# Patient Record
Sex: MALE | Race: WHITE | NOT HISPANIC OR LATINO | Employment: FULL TIME | URBAN - METROPOLITAN AREA
[De-identification: names, ages, dates, MRNs, and addresses within clinical notes are randomized per-mention and may not be internally consistent; named-entity substitution may affect disease eponyms.]

---

## 2020-03-11 ENCOUNTER — LAB (OUTPATIENT)
Dept: LAB | Facility: CLINIC | Age: 65
End: 2020-03-11
Payer: COMMERCIAL

## 2020-03-11 DIAGNOSIS — K42.9 UMBILICAL HERNIA WITHOUT OBSTRUCTION OR GANGRENE: ICD-10-CM

## 2020-03-11 LAB
ALBUMIN SERPL BCP-MCNC: 3.5 G/DL (ref 3.5–5)
ALP SERPL-CCNC: 78 U/L (ref 46–116)
ALT SERPL W P-5'-P-CCNC: 29 U/L (ref 12–78)
ANION GAP SERPL CALCULATED.3IONS-SCNC: 8 MMOL/L (ref 4–13)
AST SERPL W P-5'-P-CCNC: 16 U/L (ref 5–45)
BASOPHILS # BLD AUTO: 0.03 THOUSANDS/ΜL (ref 0–0.1)
BASOPHILS NFR BLD AUTO: 1 % (ref 0–1)
BILIRUB SERPL-MCNC: 0.42 MG/DL (ref 0.2–1)
BUN SERPL-MCNC: 18 MG/DL (ref 5–25)
CALCIUM SERPL-MCNC: 8.8 MG/DL (ref 8.3–10.1)
CHLORIDE SERPL-SCNC: 107 MMOL/L (ref 100–108)
CO2 SERPL-SCNC: 27 MMOL/L (ref 21–32)
CREAT SERPL-MCNC: 0.85 MG/DL (ref 0.6–1.3)
EOSINOPHIL # BLD AUTO: 0.03 THOUSAND/ΜL (ref 0–0.61)
EOSINOPHIL NFR BLD AUTO: 1 % (ref 0–6)
ERYTHROCYTE [DISTWIDTH] IN BLOOD BY AUTOMATED COUNT: 13.7 % (ref 11.6–15.1)
GFR SERPL CREATININE-BSD FRML MDRD: 91 ML/MIN/1.73SQ M
GLUCOSE SERPL-MCNC: 106 MG/DL (ref 65–140)
HCT VFR BLD AUTO: 48.8 % (ref 36.5–49.3)
HGB BLD-MCNC: 16.4 G/DL (ref 12–17)
IMM GRANULOCYTES # BLD AUTO: 0.01 THOUSAND/UL (ref 0–0.2)
IMM GRANULOCYTES NFR BLD AUTO: 0 % (ref 0–2)
LYMPHOCYTES # BLD AUTO: 1.45 THOUSANDS/ΜL (ref 0.6–4.47)
LYMPHOCYTES NFR BLD AUTO: 29 % (ref 14–44)
MCH RBC QN AUTO: 30.9 PG (ref 26.8–34.3)
MCHC RBC AUTO-ENTMCNC: 33.6 G/DL (ref 31.4–37.4)
MCV RBC AUTO: 92 FL (ref 82–98)
MONOCYTES # BLD AUTO: 0.61 THOUSAND/ΜL (ref 0.17–1.22)
MONOCYTES NFR BLD AUTO: 12 % (ref 4–12)
NEUTROPHILS # BLD AUTO: 2.85 THOUSANDS/ΜL (ref 1.85–7.62)
NEUTS SEG NFR BLD AUTO: 57 % (ref 43–75)
NRBC BLD AUTO-RTO: 0 /100 WBCS
PLATELET # BLD AUTO: 175 THOUSANDS/UL (ref 149–390)
PMV BLD AUTO: 9.6 FL (ref 8.9–12.7)
POTASSIUM SERPL-SCNC: 4.3 MMOL/L (ref 3.5–5.3)
PROT SERPL-MCNC: 7.3 G/DL (ref 6.4–8.2)
RBC # BLD AUTO: 5.3 MILLION/UL (ref 3.88–5.62)
SODIUM SERPL-SCNC: 142 MMOL/L (ref 136–145)
WBC # BLD AUTO: 4.98 THOUSAND/UL (ref 4.31–10.16)

## 2020-03-11 PROCEDURE — 80053 COMPREHEN METABOLIC PANEL: CPT

## 2020-03-11 PROCEDURE — 85025 COMPLETE CBC W/AUTO DIFF WBC: CPT

## 2020-03-11 PROCEDURE — 36415 COLL VENOUS BLD VENIPUNCTURE: CPT

## 2020-03-11 PROCEDURE — 93005 ELECTROCARDIOGRAM TRACING: CPT

## 2020-03-12 LAB
ATRIAL RATE: 89 BPM
P AXIS: 59 DEGREES
PR INTERVAL: 154 MS
QRS AXIS: 65 DEGREES
QRSD INTERVAL: 104 MS
QT INTERVAL: 378 MS
QTC INTERVAL: 459 MS
T WAVE AXIS: 14 DEGREES
VENTRICULAR RATE: 89 BPM

## 2020-03-12 PROCEDURE — 93010 ELECTROCARDIOGRAM REPORT: CPT | Performed by: INTERNAL MEDICINE

## 2020-03-13 DIAGNOSIS — Z91.89 AT RISK FOR OBSTRUCTIVE SLEEP APNEA: Primary | ICD-10-CM

## 2020-03-15 PROBLEM — K42.9 UMBILICAL HERNIA: Status: ACTIVE | Noted: 2020-03-15

## 2020-08-17 NOTE — H&P (VIEW-ONLY)
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, he plans to delay surgical repair for several months  I gave him reassurance that this was acceptable since he is presently asymptomatic  Abdominal binder was recommended in the interim  He is agreeable  He has several work stresses that we discussed  He is considering an early custodial  We discussed his operative repair and the need for time off after work for recovery  Diagnoses and all orders for this visit:    Umbilical hernia        Subjective:      Patient ID: Adamaris Menendez is a 76 y o  male  Patient presents for pre op exam for umbilical hernia surgery  The following portions of the patient's history were reviewed and updated as appropriate:     He  has no past medical history on file  He  has a past surgical history that includes Knee arthroscopy (Left) and Nasal sinus surgery  His family history is not on file  He  reports that he has never smoked  He has never used smokeless tobacco  He reports current alcohol use  He reports that he does not use drugs  No current outpatient medications on file  No current facility-administered medications for this visit  He has No Known Allergies       Review of Systems   Constitutional: Negative  Negative for activity change  HENT: Negative  Eyes: Negative  Respiratory: Negative  Cardiovascular: Negative  Gastrointestinal: Negative  Endocrine: Negative  Genitourinary: Negative  Musculoskeletal: Negative  Skin: Negative  Allergic/Immunologic: Negative  Neurological: Negative  Psychiatric/Behavioral: Negative for agitation, behavioral problems and confusion  The patient is not nervous/anxious  Objective: There were no vitals taken for this visit           Physical Exam  Constitutional: Appearance: He is well-developed  He is not diaphoretic  HENT:      Head: Normocephalic and atraumatic  Right Ear: External ear normal       Left Ear: External ear normal    Eyes:      General: No scleral icterus  Right eye: No discharge  Left eye: No discharge  Conjunctiva/sclera: Conjunctivae normal       Pupils: Pupils are equal, round, and reactive to light  Neck:      Musculoskeletal: Normal range of motion and neck supple  Thyroid: No thyromegaly  Trachea: No tracheal deviation  Cardiovascular:      Rate and Rhythm: Normal rate and regular rhythm  Heart sounds: Normal heart sounds  No murmur  No friction rub  Pulmonary:      Effort: Pulmonary effort is normal  No respiratory distress  Breath sounds: Normal breath sounds  No stridor  No wheezing  Chest:      Chest wall: No tenderness  Abdominal:      General: Bowel sounds are normal  There is no distension  Palpations: Abdomen is soft  There is no mass  Tenderness: There is no abdominal tenderness  There is no guarding or rebound  Hernia: A hernia (Umbilical hernia is present  This is not reducible ) is present  Musculoskeletal: Normal range of motion  General: No tenderness  Lymphadenopathy:      Cervical: No cervical adenopathy  Skin:     General: Skin is warm and dry  Findings: No erythema or rash  Neurological:      Mental Status: He is alert and oriented to person, place, and time  Cranial Nerves: No cranial nerve deficit        Coordination: Coordination normal    Psychiatric:         Behavior: Behavior normal          Judgment: Judgment normal

## 2020-09-07 NOTE — DISCHARGE INSTRUCTIONS
Please call the office when you leave to schedule an appointment for 2 weeks  This can be a virtual / telephone consultation or it can be in person  The choice is yours  Please call [phone]   [address] [facility] [id number]  [address] of [address] between [facility] and [facility]  Activity:    May lift 10 lb as many times as desired the 1st week,       20 lb in 2 weeks,       30 lb in 3 weeks  Walking is encouraged  Normal daily activities including climbing steps are okay  Do not engage in strenuous activity ( sit-ups or crutches) or contact sports for 4-6 weeks post-operatively    Return to Work:   Okay to return to work when you feel well if you desire  Diet:   You may return to your normal healthy diet  Wound Care: Your wound is closed with dissolvable stitches and glue  It is okay to shower  Wash incision gently with soap and water and pat dry  Do not soak incisions in bath water or swim for two weeks  Do not apply any creams or ointments  Pain Medication:   Please take as directed if needed  May use Advil or Motrin in addition  Recall, the pain medicine and anesthesia is associated with constipation  No driving while taking narcotic pain medications  Other: It is normal to developed a healing ridge / firm incision after surgery  This is your body making scar tissue  It is a good sign  Constipation is very common after general anesthesia  Please use milk of magnesia as needed in order to help prevent constipation  It is normal to get bruising after surgery  If you have questions after discharge please call the office      If you have increased pain, fever >101 5, increased drainage, redness or a bad smell at your surgery site, please call us immediately or come directly to the Emergency Room

## 2020-09-08 ENCOUNTER — APPOINTMENT (OUTPATIENT)
Dept: LAB | Facility: CLINIC | Age: 65
End: 2020-09-08
Payer: COMMERCIAL

## 2020-09-08 ENCOUNTER — OFFICE VISIT (OUTPATIENT)
Dept: LAB | Facility: CLINIC | Age: 65
End: 2020-09-08
Payer: COMMERCIAL

## 2020-09-08 DIAGNOSIS — R20.2 NUMBNESS AND TINGLING OF BOTH LEGS: ICD-10-CM

## 2020-09-08 DIAGNOSIS — R20.0 NUMBNESS AND TINGLING OF BOTH LEGS: ICD-10-CM

## 2020-09-08 DIAGNOSIS — K42.9 UMBILICAL HERNIA WITHOUT OBSTRUCTION OR GANGRENE: ICD-10-CM

## 2020-09-08 LAB
ALBUMIN SERPL BCP-MCNC: 3.7 G/DL (ref 3.5–5)
ALP SERPL-CCNC: 76 U/L (ref 46–116)
ALT SERPL W P-5'-P-CCNC: 17 U/L (ref 12–78)
ANION GAP SERPL CALCULATED.3IONS-SCNC: 9 MMOL/L (ref 4–13)
AST SERPL W P-5'-P-CCNC: 14 U/L (ref 5–45)
BILIRUB SERPL-MCNC: 0.41 MG/DL (ref 0.2–1)
BUN SERPL-MCNC: 17 MG/DL (ref 5–25)
CALCIUM SERPL-MCNC: 8.6 MG/DL (ref 8.3–10.1)
CHLORIDE SERPL-SCNC: 104 MMOL/L (ref 100–108)
CO2 SERPL-SCNC: 26 MMOL/L (ref 21–32)
CREAT SERPL-MCNC: 0.84 MG/DL (ref 0.6–1.3)
ERYTHROCYTE [DISTWIDTH] IN BLOOD BY AUTOMATED COUNT: 13.5 % (ref 11.6–15.1)
EST. AVERAGE GLUCOSE BLD GHB EST-MCNC: 117 MG/DL
GFR SERPL CREATININE-BSD FRML MDRD: 91 ML/MIN/1.73SQ M
GLUCOSE SERPL-MCNC: 106 MG/DL (ref 65–140)
HBA1C MFR BLD: 5.7 %
HCT VFR BLD AUTO: 48 % (ref 36.5–49.3)
HGB BLD-MCNC: 16 G/DL (ref 12–17)
MCH RBC QN AUTO: 30.6 PG (ref 26.8–34.3)
MCHC RBC AUTO-ENTMCNC: 33.3 G/DL (ref 31.4–37.4)
MCV RBC AUTO: 92 FL (ref 82–98)
PLATELET # BLD AUTO: 192 THOUSANDS/UL (ref 149–390)
PMV BLD AUTO: 10.4 FL (ref 8.9–12.7)
POTASSIUM SERPL-SCNC: 3.9 MMOL/L (ref 3.5–5.3)
PROT SERPL-MCNC: 7.5 G/DL (ref 6.4–8.2)
RBC # BLD AUTO: 5.23 MILLION/UL (ref 3.88–5.62)
SODIUM SERPL-SCNC: 139 MMOL/L (ref 136–145)
WBC # BLD AUTO: 6.34 THOUSAND/UL (ref 4.31–10.16)

## 2020-09-08 PROCEDURE — 80053 COMPREHEN METABOLIC PANEL: CPT

## 2020-09-08 PROCEDURE — 93005 ELECTROCARDIOGRAM TRACING: CPT

## 2020-09-08 PROCEDURE — 36415 COLL VENOUS BLD VENIPUNCTURE: CPT

## 2020-09-08 PROCEDURE — 83036 HEMOGLOBIN GLYCOSYLATED A1C: CPT

## 2020-09-08 PROCEDURE — 85027 COMPLETE CBC AUTOMATED: CPT

## 2020-09-09 LAB
ATRIAL RATE: 78 BPM
P AXIS: 40 DEGREES
PR INTERVAL: 160 MS
QRS AXIS: 66 DEGREES
QRSD INTERVAL: 106 MS
QT INTERVAL: 394 MS
QTC INTERVAL: 449 MS
T WAVE AXIS: 19 DEGREES
VENTRICULAR RATE: 78 BPM

## 2020-09-09 PROCEDURE — 93010 ELECTROCARDIOGRAM REPORT: CPT | Performed by: INTERNAL MEDICINE

## 2020-09-09 NOTE — PRE-PROCEDURE INSTRUCTIONS
No outpatient medications have been marked as taking for the 9/11/20 encounter [facility] Encounter)      Pre op,medications and showering instructions reviewed-Patient has hibiclens

## 2020-09-10 ENCOUNTER — ANESTHESIA EVENT (OUTPATIENT)
Dept: PERIOP | Facility: HOSPITAL | Age: 65
End: 2020-09-10
Payer: COMMERCIAL

## 2020-09-11 ENCOUNTER — HOSPITAL ENCOUNTER (OUTPATIENT)
Facility: HOSPITAL | Age: 65
Setting detail: OUTPATIENT SURGERY
Discharge: HOME | End: 2020-09-11
Attending: SURGERY | Admitting: SURGERY
Payer: COMMERCIAL

## 2020-09-11 ENCOUNTER — ANESTHESIA (OUTPATIENT)
Dept: PERIOP | Facility: HOSPITAL | Age: 65
End: 2020-09-11
Payer: COMMERCIAL

## 2020-09-11 VITALS
DIASTOLIC BLOOD PRESSURE: 85 MMHG | HEIGHT: 74 IN | HEART RATE: 63 BPM | OXYGEN SATURATION: 99 % | TEMPERATURE: 97.2 F | BODY MASS INDEX: 34.65 KG/M2 | SYSTOLIC BLOOD PRESSURE: 142 MMHG | RESPIRATION RATE: 16 BRPM | WEIGHT: 270 LBS

## 2020-09-11 VITALS — HEART RATE: 82 BPM

## 2020-09-11 DIAGNOSIS — K42.9 UMBILICAL HERNIA WITHOUT OBSTRUCTION AND WITHOUT GANGRENE: Primary | ICD-10-CM

## 2020-09-11 PROCEDURE — C1781 MESH (IMPLANTABLE): HCPCS | Performed by: SURGERY

## 2020-09-11 PROCEDURE — 49585 PR REPAIR UMBILICAL HERN,5+Y/O,REDUC: CPT | Performed by: SURGERY

## 2020-09-11 DEVICE — VENTRALEX ST HERNIA PATCH
Type: IMPLANTABLE DEVICE | Site: UMBILICAL | Status: FUNCTIONAL
Brand: VENTRALEX ST HERNIA PATCH

## 2020-09-11 RX ORDER — FENTANYL CITRATE/PF 50 MCG/ML
50 SYRINGE (ML) INJECTION
Status: DISCONTINUED | OUTPATIENT
Start: 2020-09-11 | End: 2020-09-11 | Stop reason: HOSPADM

## 2020-09-11 RX ORDER — SODIUM CHLORIDE, SODIUM LACTATE, POTASSIUM CHLORIDE, CALCIUM CHLORIDE 600; 310; 30; 20 MG/100ML; MG/100ML; MG/100ML; MG/100ML
75 INJECTION, SOLUTION INTRAVENOUS CONTINUOUS
Status: DISCONTINUED | OUTPATIENT
Start: 2020-09-11 | End: 2020-09-11 | Stop reason: HOSPADM

## 2020-09-11 RX ORDER — PROMETHAZINE HYDROCHLORIDE 25 MG/ML
12.5 INJECTION, SOLUTION INTRAMUSCULAR; INTRAVENOUS ONCE AS NEEDED
Status: DISCONTINUED | OUTPATIENT
Start: 2020-09-11 | End: 2020-09-11 | Stop reason: HOSPADM

## 2020-09-11 RX ORDER — BUPIVACAINE HYDROCHLORIDE AND EPINEPHRINE 2.5; 5 MG/ML; UG/ML
INJECTION, SOLUTION INFILTRATION; PERINEURAL AS NEEDED
Status: DISCONTINUED | OUTPATIENT
Start: 2020-09-11 | End: 2020-09-11 | Stop reason: HOSPADM

## 2020-09-11 RX ORDER — ONDANSETRON 2 MG/ML
4 INJECTION INTRAMUSCULAR; INTRAVENOUS EVERY 6 HOURS PRN
Status: DISCONTINUED | OUTPATIENT
Start: 2020-09-11 | End: 2020-09-11 | Stop reason: HOSPADM

## 2020-09-11 RX ORDER — MAGNESIUM HYDROXIDE 1200 MG/15ML
LIQUID ORAL AS NEEDED
Status: DISCONTINUED | OUTPATIENT
Start: 2020-09-11 | End: 2020-09-11 | Stop reason: HOSPADM

## 2020-09-11 RX ORDER — SODIUM CHLORIDE, SODIUM LACTATE, POTASSIUM CHLORIDE, CALCIUM CHLORIDE 600; 310; 30; 20 MG/100ML; MG/100ML; MG/100ML; MG/100ML
INJECTION, SOLUTION INTRAVENOUS CONTINUOUS PRN
Status: DISCONTINUED | OUTPATIENT
Start: 2020-09-11 | End: 2020-09-11

## 2020-09-11 RX ORDER — LIDOCAINE HYDROCHLORIDE 10 MG/ML
INJECTION, SOLUTION EPIDURAL; INFILTRATION; INTRACAUDAL; PERINEURAL AS NEEDED
Status: DISCONTINUED | OUTPATIENT
Start: 2020-09-11 | End: 2020-09-11

## 2020-09-11 RX ORDER — KETOROLAC TROMETHAMINE 30 MG/ML
INJECTION, SOLUTION INTRAMUSCULAR; INTRAVENOUS AS NEEDED
Status: DISCONTINUED | OUTPATIENT
Start: 2020-09-11 | End: 2020-09-11

## 2020-09-11 RX ORDER — ACETAMINOPHEN 325 MG/1
650 TABLET ORAL EVERY 6 HOURS PRN
Status: DISCONTINUED | OUTPATIENT
Start: 2020-09-11 | End: 2020-09-11 | Stop reason: HOSPADM

## 2020-09-11 RX ORDER — ONDANSETRON 2 MG/ML
4 INJECTION INTRAMUSCULAR; INTRAVENOUS ONCE AS NEEDED
Status: DISCONTINUED | OUTPATIENT
Start: 2020-09-11 | End: 2020-09-11 | Stop reason: HOSPADM

## 2020-09-11 RX ORDER — ONDANSETRON 2 MG/ML
INJECTION INTRAMUSCULAR; INTRAVENOUS AS NEEDED
Status: DISCONTINUED | OUTPATIENT
Start: 2020-09-11 | End: 2020-09-11

## 2020-09-11 RX ORDER — FENTANYL CITRATE 50 UG/ML
INJECTION, SOLUTION INTRAMUSCULAR; INTRAVENOUS AS NEEDED
Status: DISCONTINUED | OUTPATIENT
Start: 2020-09-11 | End: 2020-09-11

## 2020-09-11 RX ORDER — MIDAZOLAM HYDROCHLORIDE 2 MG/2ML
INJECTION, SOLUTION INTRAMUSCULAR; INTRAVENOUS AS NEEDED
Status: DISCONTINUED | OUTPATIENT
Start: 2020-09-11 | End: 2020-09-11

## 2020-09-11 RX ORDER — OXYCODONE HYDROCHLORIDE AND ACETAMINOPHEN 5; 325 MG/1; MG/1
1 TABLET ORAL EVERY 4 HOURS PRN
Qty: 20 TABLET | Refills: 0 | Status: SHIPPED | OUTPATIENT
Start: 2020-09-11

## 2020-09-11 RX ORDER — CEFAZOLIN SODIUM 1 G/50ML
1000 SOLUTION INTRAVENOUS ONCE
Status: COMPLETED | OUTPATIENT
Start: 2020-09-11 | End: 2020-09-11

## 2020-09-11 RX ORDER — DEXAMETHASONE SODIUM PHOSPHATE 4 MG/ML
INJECTION, SOLUTION INTRA-ARTICULAR; INTRALESIONAL; INTRAMUSCULAR; INTRAVENOUS; SOFT TISSUE AS NEEDED
Status: DISCONTINUED | OUTPATIENT
Start: 2020-09-11 | End: 2020-09-11

## 2020-09-11 RX ORDER — OXYCODONE HYDROCHLORIDE 5 MG/1
5 TABLET ORAL EVERY 4 HOURS PRN
Status: DISCONTINUED | OUTPATIENT
Start: 2020-09-11 | End: 2020-09-11 | Stop reason: HOSPADM

## 2020-09-11 RX ORDER — PROPOFOL 10 MG/ML
INJECTION, EMULSION INTRAVENOUS AS NEEDED
Status: DISCONTINUED | OUTPATIENT
Start: 2020-09-11 | End: 2020-09-11

## 2020-09-11 RX ORDER — CEFAZOLIN SODIUM 2 G/50ML
2000 SOLUTION INTRAVENOUS ONCE
Status: COMPLETED | OUTPATIENT
Start: 2020-09-11 | End: 2020-09-11

## 2020-09-11 RX ORDER — HYDROMORPHONE HCL/PF 1 MG/ML
0.2 SYRINGE (ML) INJECTION
Status: DISCONTINUED | OUTPATIENT
Start: 2020-09-11 | End: 2020-09-11 | Stop reason: HOSPADM

## 2020-09-11 RX ADMIN — SODIUM CHLORIDE, SODIUM LACTATE, POTASSIUM CHLORIDE, AND CALCIUM CHLORIDE: .6; .31; .03; .02 INJECTION, SOLUTION INTRAVENOUS at 13:41

## 2020-09-11 RX ADMIN — ONDANSETRON 4 MG: 2 INJECTION INTRAMUSCULAR; INTRAVENOUS at 15:27

## 2020-09-11 RX ADMIN — KETOROLAC TROMETHAMINE 30 MG: 30 INJECTION, SOLUTION INTRAMUSCULAR at 15:00

## 2020-09-11 RX ADMIN — DEXAMETHASONE SODIUM PHOSPHATE 4 MG: 4 INJECTION, SOLUTION INTRAMUSCULAR; INTRAVENOUS at 14:38

## 2020-09-11 RX ADMIN — LIDOCAINE HYDROCHLORIDE 50 MG: 10 INJECTION, SOLUTION EPIDURAL; INFILTRATION; INTRACAUDAL; PERINEURAL at 14:18

## 2020-09-11 RX ADMIN — ONDANSETRON 4 MG: 2 INJECTION INTRAMUSCULAR; INTRAVENOUS at 14:39

## 2020-09-11 RX ADMIN — MIDAZOLAM HYDROCHLORIDE 2 MG: 1 INJECTION, SOLUTION INTRAMUSCULAR; INTRAVENOUS at 14:18

## 2020-09-11 RX ADMIN — FENTANYL CITRATE 100 MCG: 50 INJECTION INTRAMUSCULAR; INTRAVENOUS at 14:18

## 2020-09-11 RX ADMIN — PROPOFOL 200 MG: 10 INJECTION, EMULSION INTRAVENOUS at 14:18

## 2020-09-11 RX ADMIN — CEFAZOLIN SODIUM 2000 MG: 2 SOLUTION INTRAVENOUS at 14:14

## 2020-09-11 RX ADMIN — FENTANYL CITRATE 100 MCG: 50 INJECTION INTRAMUSCULAR; INTRAVENOUS at 14:46

## 2020-09-11 RX ADMIN — CEFAZOLIN SODIUM 1000 MG: 1 SOLUTION INTRAVENOUS at 14:14

## 2020-09-11 NOTE — INTERVAL H&P NOTE
H&P reviewed  After examining the patient I find no changes in the patients condition since the H&P had been written      Vitals:    09/11/20 1319   BP: 137/84   Pulse: 103   Resp: 20   Temp: (!) 97 3 °F (36 3 °C)   SpO2: 97%

## 2020-09-11 NOTE — ANESTHESIA PREPROCEDURE EVALUATION
Procedure:  UMBILICAL HERNIA REPAIR (N/A Abdomen)    Relevant Problems   No relevant active problems             Anesthesia Plan  ASA Score- 2     Anesthesia Type- general with ASA Monitors  Additional Monitors:   Airway Plan:           Plan Factors-Exercise tolerance (METS): >4 METS  Chart reviewed  Existing labs reviewed  Induction- intravenous  Postoperative Plan- Plan for postoperative opioid use  Informed Consent- Anesthetic plan and risks discussed with patient  I personally reviewed this patient with the CRNA  Discussed and agreed on the Anesthesia Plan with the CRNA  Patience Pascal

## 2020-09-11 NOTE — ANESTHESIA POSTPROCEDURE EVALUATION
Post-Op Assessment Note    CV Status:  Stable    Pain management: adequate     Mental Status:  Alert and awake   Hydration Status:  Euvolemic   PONV Controlled:  Controlled   Airway Patency:  Patent      Post Op Vitals Reviewed: Yes          Vitals:    09/11/20 1615   BP: 142/85   Pulse: 63   Resp: 16   Temp:    SpO2: 99%

## 2020-09-11 NOTE — OP NOTE
OPERATIVE REPORT  PATIENT NAME: David Silver    :  1955  MRN: [id number]  Pt Location: AN OR ROOM 01    SURGERY DATE: 2020    Surgeon(s) and Role:     * Coleman Morris MD - Primary     * Je Sandoval MD - Assisting     * Radha Jacobson MD - Assisting    Preop Diagnosis:  Umbilical hernia without obstruction or gangrene [K42 9]    Post-Op Diagnosis Codes     * Umbilical hernia without obstruction or gangrene [K42 9]    Procedure(s) (LRB):  UMBILICAL HERNIA REPAIR (N/A)    Specimen(s):  * No specimens in log *    Estimated Blood Loss:   3 mL    Drains:  * No LDAs found *    Anesthesia Type:   General    Operative Indications:  Umbilical hernia without obstruction or gangrene [K42 9]      Operative Findings:      Complications:   None    Procedure and Technique:  Patient was identified visually and via armband  Placed in the supine position  After general anesthesia the abdomen was prepped and draped in a sterile fashion  0 25% Marcaine with epinephrine was infiltrated  Curvilinear incision was made below the umbilicus  This carried down through skin subcutaneous tissue  Hernia sac was dissected free and amputated  A preperitoneal plane was then created  A 2 5 in polypropylene mesh was then inserted  This was sewn in place and pexy it once repairing the hernia defect with interrupted figure-of-eight 1  Vicryl suture  Wound was then closed with 3 0 Vicryl subcutaneous and 4 Monocryl sutures  Dermabond was then applied  Patient tolerated this procedure well  Sponge instrument count correct x2     I was present for the entire procedure    Patient Disposition:  PACU     SIGNATURE: Coleman Morris MD  DATE: 2020  TIME: 2:50 PM

## 2022-10-03 ENCOUNTER — OFFICE VISIT (OUTPATIENT)
Dept: FAMILY MEDICINE CLINIC | Facility: CLINIC | Age: 67
End: 2022-10-03
Payer: COMMERCIAL

## 2022-10-03 VITALS
OXYGEN SATURATION: 96 % | HEART RATE: 88 BPM | SYSTOLIC BLOOD PRESSURE: 132 MMHG | DIASTOLIC BLOOD PRESSURE: 80 MMHG | WEIGHT: 247 LBS | HEIGHT: 74 IN | RESPIRATION RATE: 18 BRPM | BODY MASS INDEX: 31.7 KG/M2

## 2022-10-03 DIAGNOSIS — Z12.11 SCREENING FOR COLON CANCER: ICD-10-CM

## 2022-10-03 DIAGNOSIS — Z12.5 SCREENING FOR PROSTATE CANCER: ICD-10-CM

## 2022-10-03 DIAGNOSIS — R42 LIGHTHEADEDNESS: Primary | ICD-10-CM

## 2022-10-03 DIAGNOSIS — R73.03 PREDIABETES: ICD-10-CM

## 2022-10-03 PROBLEM — K42.9 UMBILICAL HERNIA: Status: RESOLVED | Noted: 2020-03-15 | Resolved: 2022-10-03

## 2022-10-03 PROCEDURE — 99203 OFFICE O/P NEW LOW 30 MIN: CPT | Performed by: NURSE PRACTITIONER

## 2022-10-03 NOTE — PROGRESS NOTES
Name: Gretta Grimaldo      : 1955      MRN: [id number]  Encounter Provider: BLANCA Mera  Encounter Date: 10/3/2022   Encounter department: [facility]     1  Lightheadedness  -     Comprehensive metabolic panel; Future  -     CBC and differential; Future  -     TSH, 3rd generation with Free T4 reflex; Future  -     ECG 12 lead; Future    Patient reports that he felt lightheaded about a month ago in the yard  Patient reports that he was watering plants at the time  Patient reports that he also felt SOB  Denies any chest pain or palpitations  Denies any seizures, fainting, Has, vision changes, numbness, or tingling  Patient reports that he went to sit on the porch and the episode resolved  Patient reports that he never went to the ED for evaluation  Lab work and EKG ordered  Will follow-up results with the patient  Patient instructed to go right to the ED if he has another episode  2  Prediabetes  -     Comprehensive metabolic panel; Future  -     CBC and differential; Future  -     TSH, 3rd generation with Free T4 reflex; Future  -     Lipid Panel with Direct LDL reflex; Future  -     HEMOGLOBIN A1C W/ EAG ESTIMATION; Future    Last hemoglobin A1c was 5 7 in   Repeat hemoglobin A1c ordered  3  BMI 31 0-31 9,adult  -     Comprehensive metabolic panel; Future  -     CBC and differential; Future  -     TSH, 3rd generation with Free T4 reflex; Future  -     Lipid Panel with Direct LDL reflex; Future    Patient instructed to eat a healthy low fat/low carb diet  4  Screening for prostate cancer  -     PSA, Total Screen; Future    5  Screening for colon cancer  -     Cologuard    Lab work ordered  Patient instructed to follow-up in 1 month for a medicare wellness/follow-up visit or sooner prn  BMI Counseling: Body mass index is 31 71 kg/m²   The BMI is above normal  Nutrition recommendations include encouraging healthy choices of fruits and vegetables, consuming healthier snacks, reducing intake of saturated and trans fat and reducing intake of cholesterol  Rationale for BMI follow-up plan is due to patient being overweight or obese  Depression Screening and Follow-up Plan: Patient was screened for depression during today's encounter  They screened negative with a PHQ-2 score of 0  Subjective      Patient is here to establish care  Patient's hemoglobin A1c was 5 7 2 years ago  Patient reports that he tries to eat a healthy diet  Patient's BMI is 31  Patient reports that he does not really exercise formally  Patient reports that he is interested in getting lab work done and health screenings  Patient reports that about a month ago he felt lightheaded in the yard  Patient reports that he did not feel right  Denies any chest pain or palpitations  Patient reports that he felt SOB at the time  Denies any seizures or fainting  Denies any Has, vision changes, numbness or tingling  Patient reports that he went to sit on the porch and the episode resolved  Patient reports that he never went to the ED for evaluation  Review of Systems   Constitutional: Negative for chills, fatigue and fever  HENT: Negative for congestion, ear pain, sinus pressure, sore throat and trouble swallowing  Eyes: Negative for pain, discharge and redness  Respiratory: Negative for cough, chest tightness and wheezing  Cardiovascular: Negative for chest pain, palpitations and leg swelling  Gastrointestinal: Negative for abdominal pain, blood in stool, diarrhea, nausea and vomiting  Genitourinary: Negative for dysuria, frequency, hematuria and urgency  Musculoskeletal: Negative for myalgias and neck pain  Skin: Negative for rash  Neurological: Positive for light-headedness  Negative for dizziness, seizures, syncope and headaches         Current Outpatient Medications on File Prior to Visit   Medication Sig    [DISCONTINUED] oxyCODONE-acetaminophen (PERCOCET) 5-325 mg per tablet Take 1 tablet by mouth every 4 (four) hours as needed for moderate pain for up to 20 dosesMax Daily Amount: 6 tablets (Patient not taking: Reported on 10/3/2022)       Objective     /80   Pulse 88   Resp 18   Ht 6' 2" (1 88 m)   Wt 112 kg (247 lb)   SpO2 96%   BMI 31 71 kg/m²     Physical Exam  Vitals reviewed  Constitutional:       General: He is not in acute distress  Appearance: He is obese  He is not ill-appearing or diaphoretic  HENT:      Right Ear: Tympanic membrane, ear canal and external ear normal       Left Ear: Tympanic membrane, ear canal and external ear normal       Nose: Nose normal       Mouth/Throat:      Mouth: Mucous membranes are moist       Pharynx: Oropharynx is clear  No posterior oropharyngeal erythema  Eyes:      Conjunctiva/sclera: Conjunctivae normal       Pupils: Pupils are equal, round, and reactive to light  Cardiovascular:      Rate and Rhythm: Normal rate and regular rhythm  Pulses: Normal pulses  Heart sounds: Normal heart sounds  Comments: No edema noted  Pulmonary:      Effort: Pulmonary effort is normal  No respiratory distress  Breath sounds: Normal breath sounds  No wheezing  Abdominal:      General: There is no distension  Palpations: Abdomen is soft  There is no mass  Tenderness: There is no abdominal tenderness  Musculoskeletal:      Cervical back: Normal range of motion  Comments: Gait wnl  Lymphadenopathy:      Cervical: No cervical adenopathy  Skin:     Findings: No rash  Neurological:      Mental Status: He is alert and oriented to person, place, and time     Psychiatric:         Mood and Affect: Mood normal        BLANCA Carver

## 2022-12-02 PROBLEM — Z12.11 SCREENING FOR COLON CANCER: Status: RESOLVED | Noted: 2022-10-03 | Resolved: 2022-12-02

## 2022-12-02 PROBLEM — Z12.5 SCREENING FOR PROSTATE CANCER: Status: RESOLVED | Noted: 2022-10-03 | Resolved: 2022-12-02

## 2023-08-30 ENCOUNTER — OFFICE VISIT (OUTPATIENT)
Dept: FAMILY MEDICINE CLINIC | Facility: CLINIC | Age: 68
End: 2023-08-30
Payer: COMMERCIAL

## 2023-08-30 VITALS
DIASTOLIC BLOOD PRESSURE: 82 MMHG | BODY MASS INDEX: 32.08 KG/M2 | WEIGHT: 250 LBS | HEIGHT: 74 IN | OXYGEN SATURATION: 96 % | RESPIRATION RATE: 16 BRPM | HEART RATE: 74 BPM | SYSTOLIC BLOOD PRESSURE: 124 MMHG

## 2023-08-30 DIAGNOSIS — R73.03 PREDIABETES: ICD-10-CM

## 2023-08-30 DIAGNOSIS — F32.A DEPRESSION, UNSPECIFIED DEPRESSION TYPE: ICD-10-CM

## 2023-08-30 DIAGNOSIS — Z00.00 MEDICARE ANNUAL WELLNESS VISIT, SUBSEQUENT: Primary | ICD-10-CM

## 2023-08-30 DIAGNOSIS — Z12.11 SCREENING FOR COLON CANCER: ICD-10-CM

## 2023-08-30 DIAGNOSIS — F41.9 ANXIETY: ICD-10-CM

## 2023-08-30 PROCEDURE — G0439 PPPS, SUBSEQ VISIT: HCPCS | Performed by: NURSE PRACTITIONER

## 2023-08-30 PROCEDURE — 99213 OFFICE O/P EST LOW 20 MIN: CPT | Performed by: NURSE PRACTITIONER

## 2023-08-30 RX ORDER — SERTRALINE HYDROCHLORIDE 25 MG/1
25 TABLET, FILM COATED ORAL DAILY
Qty: 30 TABLET | Refills: 1 | Status: SHIPPED | OUTPATIENT
Start: 2023-08-30

## 2023-08-30 NOTE — PROGRESS NOTES
Assessment and Plan:     Problem List Items Addressed This Visit        Other    Prediabetes  Patient instructed to get his lab work done. Patient instructed to eat a healthy low fat/low carb diet. BMI 32.0-32.9,adult  Patient instructed to get his lab work done. Patient instructed to eat a healthy low fat/low carb diet. Medicare annual wellness visit, subsequent - Primary  Patient instructed to eat a healthy low fat/low carb diet. Depression    Relevant Medications    sertraline (ZOLOFT) 25 mg tablet    Patient reports depression and anxiety for the past 5 months. Patient reports that his son  5 months ago. Patient is following up with a therapist.   Patient is interested in medication for depression and anxiety. Discussion on treatment. Patient denies any suicidal thoughts or homicidal thoughts. Sertraline 25mg daily prescribed. Medication information and side effects reviewed. Patient instructed to go to the ED right away if he develops any suicidal thoughts or homicidal thoughts. Anxiety    Relevant Medications    sertraline (ZOLOFT) 25 mg tablet    Sertraline 25mg daily prescribed. Medication information and side effects reviewed. Continue to follow-up with his therapist.       Screening for colon cancer    Relevant Orders    Cologuard    Patient instructed to get his lab work done. Patient instructed to follow-up in 1 month or sooner prn. BMI Counseling: Body mass index is 32.1 kg/m². The BMI is above normal. Nutrition recommendations include encouraging healthy choices of fruits and vegetables, decreasing fast food intake, consuming healthier snacks, reducing intake of saturated and trans fat and reducing intake of cholesterol. Rationale for BMI follow-up plan is due to patient being overweight or obese. Depression Screening and Follow-up Plan: Patient was screened for depression during today's encounter.  They screened negative with a PHQ-2 score of 0.      Preventive health issues were discussed with patient, and age appropriate screening tests were ordered as noted in patient's After Visit Summary. Personalized health advice and appropriate referrals for health education or preventive services given if needed, as noted in patient's After Visit Summary. History of Present Illness:     Patient presents for a Medicare Wellness Visit    Patient is here for a follow-up for chronic medical conditions. Patient is here for a follow-up for prediabetes. Patient's BMI is 32.10. Patient reports that he tries to eat a healthy diet. Patient reports that he does not exercise on a regular basis. Patient reports that he did not get his lab work done. Patient reports depression and anxiety for the past 5 months. Patient reports that his son committed suicide 5 months ago. Denies any suicidal thoughts or homicidal thoughts. Patient has been following up with a therapist.   Patient reports that he is interested in medication for depression and anxiety. Patient Care Team:  Gonzalez Taylor as PCP - General (Family Medicine)  Oh Ayala MD     Review of Systems:     Review of Systems   Constitutional: Negative for chills and fever. HENT: Negative for congestion, ear pain, sinus pressure and sore throat. Eyes: Negative for pain, discharge and redness. Respiratory: Negative for cough, chest tightness and shortness of breath. Cardiovascular: Negative for chest pain, palpitations and leg swelling. Gastrointestinal: Negative for abdominal pain, diarrhea, nausea and vomiting. Genitourinary: Negative for dysuria, frequency and hematuria. Skin: Negative for rash. Neurological: Negative for dizziness, seizures, syncope, light-headedness and headaches. Psychiatric/Behavioral: Negative for suicidal ideas. As noted in HPI.          Problem List:     Patient Active Problem List   Diagnosis   • Lightheadedness   • Prediabetes   • BMI 32.0-32.9,adult   • Medicare annual wellness visit, subsequent   • Depression   • Anxiety   • Screening for colon cancer      Past Medical and Surgical History:     Past Medical History:   Diagnosis Date   • Osteoarthritis of knee 5/84/7772   • Umbilical hernia 3/34/4832     Past Surgical History:   Procedure Laterality Date   • KNEE ARTHROSCOPY Left    • NASAL SINUS SURGERY      Nasal FX Repair   • [place] RPR UMBILICAL HRNA 5 YRS/> REDUCIBLE N/A 9/11/2020    Procedure: UMBILICAL HERNIA REPAIR;  Surgeon: Emily Covarrubias MD;  Location: [facility];  Service: General      Family History:     Family History   Problem Relation Age of Onset   • Scleroderma Mother    • No Known Problems Father       Social History:     Social History     Socioeconomic History   • Marital status: /Civil Union     Spouse name: None   • Number of children: None   • Years of education: None   • Highest education level: None   Occupational History   • None   Tobacco Use   • Smoking status: Never   • Smokeless tobacco: Never   Vaping Use   • Vaping Use: Never used   Substance and Sexual Activity   • Alcohol use: Yes     Comment: occasional alcohol use   • Drug use: Not Currently     Types: Marijuana   • Sexual activity: None   Other Topics Concern   • None   Social History Narrative   • None     Social Determinants of Health     Financial Resource Strain: Low Risk  (8/30/2023)    Overall Financial Resource Strain (CARDIA)    • Difficulty of Paying Living Expenses: Not very hard   Food Insecurity: Not on file   Transportation Needs: No Transportation Needs (8/30/2023)    PRAPARE - Transportation    • Lack of Transportation (Medical): No    • Lack of Transportation (Non-Medical):  No   Physical Activity: Not on file   Stress: Not on file   Social Connections: Not on file   Intimate Partner Violence: Not on file   Housing Stability: Not on file      Medications and Allergies:     Current Outpatient Medications   Medication Sig Dispense Refill • sertraline (ZOLOFT) 25 mg tablet Take 1 tablet (25 mg total) by mouth daily 30 tablet 1     No current facility-administered medications for this visit. No Known Allergies   Immunizations: There is no immunization history on file for this patient. Health Maintenance:         Topic Date Due   • Hepatitis C Screening  Never done   • Colorectal Cancer Screening  Never done         Topic Date Due   • COVID-19 Vaccine (1) Never done   • DTaP,Tdap,and Td Vaccines (1 - Tdap) Never done   • Pneumococcal Vaccine: 65+ Years (1 - PCV) Never done   • Influenza Vaccine (1) 09/01/2023      Medicare Screening Tests and Risk Assessments:     Rach Saleem is here for his Subsequent Wellness visit. Health Risk Assessment:   Patient rates overall health as good. Patient feels that their physical health rating is same. Patient is satisfied with their life. Eyesight was rated as same. Hearing was rated as same. Patient feels that their emotional and mental health rating is much worse. Patients states they are never, rarely angry. Patient states they are never, rarely unusually tired/fatigued. Pain experienced in the last 7 days has been none. Patient states that he has experienced no weight loss or gain in last 6 months. Depression Screening:   PHQ-2 Score: 0      Fall Risk Screening: In the past year, patient has experienced: no history of falling in past year      Home Safety:  Patient does not have trouble with stairs inside or outside of their home. Patient has working smoke alarms and has working carbon monoxide detector. Home safety hazards include: none. Nutrition:   Current diet is Regular. Medications:   Patient is not currently taking any over-the-counter supplements. Patient is able to manage medications.      Activities of Daily Living (ADLs)/Instrumental Activities of Daily Living (IADLs):   Walk and transfer into and out of bed and chair?: Yes  Dress and groom yourself?: Yes    Bathe or shower yourself?: Yes    Feed yourself? Yes  Do your laundry/housekeeping?: Yes  Manage your money, pay your bills and track your expenses?: Yes  Make your own meals?: Yes    Do your own shopping?: Yes    Previous Hospitalizations:   Any hospitalizations or ED visits within the last 12 months?: No      Advance Care Planning:   Living will: No    Durable POA for healthcare: No    Advanced directive: No    Advanced directive counseling given: Yes      Cognitive Screening:   Provider or family/friend/caregiver concerned regarding cognition?: No    PREVENTIVE SCREENINGS      Cardiovascular Screening:    General: Risks and Benefits Discussed    Due for: Lipid Panel      Diabetes Screening:     General: Risks and Benefits Discussed    Due for: Blood Glucose      Colorectal Cancer Screening:     General: Risks and Benefits Discussed    Due for: Cologuard      Prostate Cancer Screening:    General: Risks and Benefits Discussed    Due for: PSA      Osteoporosis Screening:    General: Risks and Benefits Discussed and Patient Declines      Abdominal Aortic Aneurysm (AAA) Screening:    Risk factors include: age between 70-75 yo        General: Screening Not Indicated      Lung Cancer Screening:     General: Screening Not Indicated      Hepatitis C Screening:    General: Risks and Benefits Discussed and Patient Declines    Hep C Screening Accepted: No     Screening, Brief Intervention, and Referral to Treatment (SBIRT)    Screening  Typical number of drinks in a day: 0  Typical number of drinks in a week: 0  Interpretation: Low risk drinking behavior.     AUDIT-C Screenin) How often did you have a drink containing alcohol in the past year? never  2) How many drinks did you have on a typical day when you were drinking in the past year? 0  3) How often did you have 6 or more drinks on one occasion in the past year? never    AUDIT-C Score: 0  Interpretation: Score 0-3 (male): Negative screen for alcohol misuse    Single Item Drug Screening:  How often have you used an illegal drug (including marijuana) or a prescription medication for non-medical reasons in the past year? never    Single Item Drug Screen Score: 0  Interpretation: Negative screen for possible drug use disorder    Brief Intervention  Alcohol & drug use screenings were reviewed. No concerns regarding substance use disorder identified. Other Counseling Topics:   Car/seat belt/driving safety, skin self-exam, sunscreen and regular weightbearing exercise. No results found. Physical Exam:     /82   Pulse 74   Resp 16   Ht 6' 2" (1.88 m)   Wt 113 kg (250 lb)   SpO2 96%   BMI 32.10 kg/m²     Physical Exam  Vitals reviewed. Constitutional:       General: He is not in acute distress. Appearance: He is obese. He is not ill-appearing or diaphoretic. HENT:      Right Ear: External ear normal.      Left Ear: External ear normal.      Nose: Nose normal.      Mouth/Throat:      Mouth: Mucous membranes are moist.      Pharynx: Oropharynx is clear. No oropharyngeal exudate or posterior oropharyngeal erythema. Eyes:      Conjunctiva/sclera: Conjunctivae normal.      Pupils: Pupils are equal, round, and reactive to light. Cardiovascular:      Rate and Rhythm: Normal rate and regular rhythm. Pulses: Normal pulses. Heart sounds: Normal heart sounds. Comments: No edema noted. Pulmonary:      Effort: Pulmonary effort is normal. No respiratory distress. Breath sounds: Normal breath sounds. No wheezing. Musculoskeletal:      Comments: Gait wnl. Skin:     Findings: No rash. Neurological:      Mental Status: He is alert and oriented to person, place, and time.    Psychiatric:         Mood and Affect: Mood normal.          BLANCA Kong

## 2023-10-29 PROBLEM — Z12.11 SCREENING FOR COLON CANCER: Status: RESOLVED | Noted: 2023-08-30 | Resolved: 2023-10-29

## 2023-10-29 PROBLEM — Z00.00 MEDICARE ANNUAL WELLNESS VISIT, SUBSEQUENT: Status: RESOLVED | Noted: 2023-08-30 | Resolved: 2023-10-29

## 2024-06-06 ENCOUNTER — OFFICE VISIT (OUTPATIENT)
Dept: OBGYN CLINIC | Facility: CLINIC | Age: 69
End: 2024-06-06
Payer: COMMERCIAL

## 2024-06-06 ENCOUNTER — APPOINTMENT (OUTPATIENT)
Dept: RADIOLOGY | Facility: CLINIC | Age: 69
End: 2024-06-06
Payer: COMMERCIAL

## 2024-06-06 VITALS
SYSTOLIC BLOOD PRESSURE: 148 MMHG | HEART RATE: 77 BPM | BODY MASS INDEX: 34.27 KG/M2 | WEIGHT: 253 LBS | HEIGHT: 72 IN | DIASTOLIC BLOOD PRESSURE: 99 MMHG

## 2024-06-06 DIAGNOSIS — M25.562 LEFT KNEE PAIN, UNSPECIFIED CHRONICITY: ICD-10-CM

## 2024-06-06 DIAGNOSIS — R29.898 LEFT LEG WEAKNESS: Primary | ICD-10-CM

## 2024-06-06 DIAGNOSIS — M17.11 PRIMARY OSTEOARTHRITIS OF RIGHT KNEE: ICD-10-CM

## 2024-06-06 DIAGNOSIS — M47.26 OSTEOARTHRITIS OF SPINE WITH RADICULOPATHY, LUMBAR REGION: ICD-10-CM

## 2024-06-06 DIAGNOSIS — M25.562 CHRONIC PAIN OF LEFT KNEE: ICD-10-CM

## 2024-06-06 DIAGNOSIS — M17.12 PRIMARY OSTEOARTHRITIS OF LEFT KNEE: ICD-10-CM

## 2024-06-06 DIAGNOSIS — G89.29 CHRONIC PAIN OF LEFT KNEE: ICD-10-CM

## 2024-06-06 PROCEDURE — 99204 OFFICE O/P NEW MOD 45 MIN: CPT | Performed by: ORTHOPAEDIC SURGERY

## 2024-06-06 PROCEDURE — 73560 X-RAY EXAM OF KNEE 1 OR 2: CPT

## 2024-06-06 PROCEDURE — 73562 X-RAY EXAM OF KNEE 3: CPT

## 2024-06-06 NOTE — PROGRESS NOTES
Assessment/Plan:  1. Left leg weakness  Ambulatory Referral to Physical Therapy      2. Primary osteoarthritis of left knee  Ambulatory Referral to Physical Therapy      3. Chronic pain of left knee  XR knee 3 vw left non injury    XR knee 1 or 2 vw right      4. Osteoarthritis of spine with radiculopathy, lumbar region  Ambulatory referral to Spine & Pain Management      5. Primary osteoarthritis of right knee          Scribe Attestation      I,:  Marvin Ware am acting as a scribe while in the presence of the attending physician.:       I,:  Antonio Oconnor, DO personally performed the services described in this documentation    as scribed in my presence.:           Isac upon examination and review of the x-rays of the left and right knees as well as the x-rays of his lumbar spine demonstrates a constellation of issues.  I did note that he has end-stage osteoarthritis of his left knee with significant degenerative change on x-rays.  The right knee also demonstrate end stage tricompartmental osteoarthritis.  In addition, he does have significant degenerative changes of his lumbar spine that is observed on imaging from 2015.  I did note it is likely that these degenerative changes have worsened with time.  It is possible that he does have an impingement of a nerve in the lumbar spine resulting in weakness into his left leg.  He does also demonstrate atrophy of his left quadriceps when compared to the contralateral side.  This can be contributed from a impingement of the nerve at the lumbar spine as well as disuse atrophy due to the severe nature of the arthritis of his knee.  With this in mind, I would like to treat him nonoperatively with a referral to physical therapy to focus on strengthening of the hip flexors and quadriceps.  In addition, I would like to refer him to our spine and pain specialist for thorough evaluation and consultation for his lumbar spine.  In the meantime, I do recommend that he pursue  activities that are less impactful such as biking, swimming or elliptical.  He may walk for cardiovascular fitness.  However ,I did note that there is a chance that he could cause exacerbation of pain of his knee due to the severe nature of the osteoarthritis.  He may continue use of his hinged knee brace as well as oral analgesics as needed.  I would like Him to follow-up with me in 3 months time for repeat clinical evaluation.    Subjective: New patient left knee pain    Patient ID: Isac Hung is a pleasant 69 y.o. male presenting for initial evaluation of his left knee.  He states he has been experiencing weakness into his knee over the past 8 months.  He states that his left knee is much weaker than the right side.  He denies a trauma but notes that he was walking without his knee brace.  He states that he did not use knee brace as he was feeling well.  He states that his greatest complaint is walking down a hill or down the stairs and states that it is associated with weakness versus pain.  He notes that he was consulted for a total knee arthroplasty approximately 6 years ago but deferred this treatment due to lack of pain.  He states that he also has been evaluated and told that he has a spur of the lumbar spine that is impinging resulting in lumbar radiculopathy.  He states that if he is active he does not experience the symptoms into his legs.  However if he stands still for prolonged period of time he will experience paresthesias into the legs.  He notes that certain positions while laying down also seem to alleviate some of the symptoms into his legs.  He has not had any thorough evaluation of his lumbar spine up to this point.  He does present to today's visit wearing his knee sleeve brace as it does provide him with stability.  He notes that he is able to ambulate without significant difficulty but does have an antalgic gait.  Today he denies distal paresthesias.      Review of Systems    Constitutional:  Positive for activity change. Negative for chills, fever and unexpected weight change.   HENT:  Negative for hearing loss, nosebleeds and sore throat.    Eyes:  Negative for pain, redness and visual disturbance.   Respiratory:  Negative for cough, shortness of breath and wheezing.    Cardiovascular:  Negative for chest pain, palpitations and leg swelling.   Gastrointestinal:  Negative for abdominal pain, nausea and vomiting.   Endocrine: Negative for polyphagia and polyuria.   Genitourinary:  Negative for dysuria and hematuria.   Musculoskeletal:  Positive for arthralgias and myalgias.        See HPI   Skin:  Negative for rash and wound.   Neurological:  Negative for dizziness, numbness and headaches.   Psychiatric/Behavioral:  Negative for decreased concentration and suicidal ideas. The patient is not nervous/anxious.          Past Medical History:   Diagnosis Date    Osteoarthritis of knee 6/26/2015    Umbilical hernia 3/15/2020       Past Surgical History:   Procedure Laterality Date    KNEE ARTHROSCOPY Left     NASAL SINUS SURGERY      Nasal FX Repair    [place] RPR UMBILICAL HRNA 5 YRS/> REDUCIBLE N/A 9/11/2020    Procedure: UMBILICAL HERNIA REPAIR;  Surgeon: Brandin Arizmendi MD;  Location: [facility];  Service: General       Family History   Problem Relation Age of Onset    Scleroderma Mother     No Known Problems Father        Social History     Occupational History    Not on file   Tobacco Use    Smoking status: Never    Smokeless tobacco: Never   Vaping Use    Vaping status: Never Used   Substance and Sexual Activity    Alcohol use: Yes     Comment: occasional alcohol use    Drug use: Not Currently     Types: Marijuana    Sexual activity: Not on file         Current Outpatient Medications:     sertraline (ZOLOFT) 25 mg tablet, Take 1 tablet (25 mg total) by mouth daily (Patient not taking: Reported on 6/6/2024), Disp: 30 tablet, Rfl: 1    No Known Allergies    Objective:  Vitals:    06/06/24 0854    BP: 148/99   Pulse: 77       Body mass index is 34.31 kg/m².    Right Knee Exam     Tenderness   The patient is experiencing no tenderness.     Range of Motion   Extension:  0   Flexion:  110     Other   Erythema: absent  Scars: absent  Sensation: normal  Pulse: present  Effusion: no effusion present      Left Knee Exam     Tenderness   The patient is experiencing no tenderness.     Range of Motion   Extension:  0   Flexion:  110     Other   Erythema: absent  Scars: absent  Sensation: normal  Pulse: present  Swelling: none  Effusion: effusion (+1) present    Comments:  Moderate quad atrophy when compared to contralateral side    Substantial varus deformity of approximately 10 degrees correctable with stress    Quad strength: 4/5          Observations   Left Knee   Positive for effusion (+1).     Right Knee   Negative for effusion.       Physical Exam  Vitals reviewed.   HENT:      Head: Normocephalic and atraumatic.   Eyes:      General:         Right eye: No discharge.         Left eye: No discharge.      Conjunctiva/sclera: Conjunctivae normal.   Cardiovascular:      Rate and Rhythm: Normal rate.   Pulmonary:      Effort: Pulmonary effort is normal. No respiratory distress.   Musculoskeletal:      Cervical back: Normal range of motion and neck supple.      Right knee: No effusion.      Left knee: Effusion (+1) present.      Comments: As noted in Ortho exam   Skin:     General: Skin is warm and dry.   Neurological:      Mental Status: He is alert and oriented to person, place, and time.   Psychiatric:         Mood and Affect: Mood normal.         Behavior: Behavior normal.         I have personally reviewed pertinent films in PACS.      X-rays of the left knee obtained today June 6, 2024 demonstrate severe tricompartmental osteoarthritis with bone-on-bone presentation of the medial and lateral compartment with slight lateral subluxation of the tibia.  Large osteophytes and cystic change also observed.  No acute  fracture.    X-rays of the right knee obtained today June 6, 2024 demonstrate severe tricompartmental osteoarthritis with bone-on-bone presentation of the medial and lateral compartments.  Subchondral cystic change and osteophytosis also observed.  No acute fracture.    Xrays of the lumbar spine from 2015 demonstrates moderate multilevel degenerative disc disease with endplate sclerosis at multiple levels most significant at L1-L2 with anterior osteophytes.  Loss of lordotic curve also observed.  No acute fractures observed.    This document was created using speech voice recognition software.   Grammatical errors, random word insertions, pronoun errors, and incomplete sentences are an occasional consequence of this system due to software limitations, ambient noise, and hardware issues.   Any formal questions or concerns about content, text, or information contained within the body of this dictation should be directly addressed to the provider for clarification.

## 2024-06-12 ENCOUNTER — EVALUATION (OUTPATIENT)
Dept: PHYSICAL THERAPY | Facility: CLINIC | Age: 69
End: 2024-06-12
Payer: COMMERCIAL

## 2024-06-12 DIAGNOSIS — R29.898 LEFT LEG WEAKNESS: ICD-10-CM

## 2024-06-12 DIAGNOSIS — M17.12 PRIMARY OSTEOARTHRITIS OF LEFT KNEE: ICD-10-CM

## 2024-06-12 PROCEDURE — 97162 PT EVAL MOD COMPLEX 30 MIN: CPT

## 2024-06-12 PROCEDURE — 97530 THERAPEUTIC ACTIVITIES: CPT

## 2024-06-12 NOTE — PROGRESS NOTES
PT Evaluation     Today's date: 2024  Patient name: Isac Hung  : 1955  MRN: [id number]  Referring provider: Antonio Oconnor DO  Dx:   Encounter Diagnosis     ICD-10-CM    1. Primary osteoarthritis of left knee  M17.12 Ambulatory Referral to Physical Therapy      2. Left leg weakness  R29.898 Ambulatory Referral to Physical Therapy                     Assessment  Impairments: abnormal coordination, abnormal gait, abnormal muscle firing, abnormal or restricted ROM, abnormal movement, activity intolerance, impaired balance, impaired physical strength, lacks appropriate home exercise program, pain with function, safety issue, weight-bearing intolerance and poor body mechanics    Assessment details: Isac Hung is a pleasant 69 y.o. male presents with signs and symptoms consistent with:   Primary osteoarthritis of left knee  Left leg weakness    Problem List:  1) quad and glute strength  2) knee ROM    Comparable signs:  1) walking  2) squatting    he has decreased ROM, decreased strength and impaired function resulting in worry over not knowing what's wrong, wanting to avoid surgery, and fear of not being able to keep active. These impairments listed above are preventing the patient from participating in functional activity. No further referral appears necessary at this time based upon examination results, and is negative for any red flags. Prognosis is good based off HEP compliance and attendance to physical therapy 2x a week.  Positive prognostic indicators include positive attitude toward recovery and good understanding of diagnosis and treatment plan options.  Negative prognostic indicators include chronicity of symptoms, anxiety, depression.  Patent will benefit from skilled physical therapy at this time to address deficits to improve overall function and return to PLOF. Patient verbalized understanding of POC, HEP, and return demonstrated HEP. All questions were answered to patients satisfaction.      Please contact me if you have any questions or recommendations. Thank you for the referral and the opportunity to share in Isac Hung's care.              Understanding of Dx/Px/POC: good     Prognosis: good    Plan  Patient would benefit from: skilled PT  Planned modality interventions: cryotherapy, electrical stimulation/Russian stimulation, TENS and thermotherapy: hydrocollator packs    Planned therapy interventions: joint mobilization, manual therapy, neuromuscular re-education, patient education, strengthening, stretching, therapeutic activities, therapeutic exercise, home exercise program, functional ROM exercises, Eastman taping, postural training, gait training, balance, balance/weight bearing training, flexibility, graded exercise and motor coordination training    Frequency: 2x week  Duration in weeks: 8  Treatment plan discussed with: patient        Subjective Evaluation    History of Present Illness  Mechanism of injury: Patient presents to PT with L knee pain that has been ongoing. He reports that he has had an arthroscopic clean up many years ago. Patient reports that he was supposed to have a knee replacement a couple years ago. He reports not a lot of pain. He states about 8 months ago he reports that he feels that his knee has gotten noticeably weaker. He currently wears a brace and feels that his knee is weak. Goes up steps on all 4s. He reports difficulty with standing for a long period of time due to low back.       Patient did report recent life events of his wife passing.   Difficulty with: going up stairs, lifting heavy, walking downhill  Patient Goals  Patient goals for therapy: decreased pain and independence with ADLs/IADLs  Patient goal: be able to lift weights, get this as strong as possible  Pain  Current pain ratin  At worst pain ratin  Location: medial knee  Quality: tight and sharp  Relieving factors: medications  Aggravating factors: walking, standing and stair  climbing          Objective     Concurrent Complaints  Negative for night pain, disturbed sleep, bladder dysfunction, bowel dysfunction and saddle (S4) numbness    Neurological Testing     Sensation     Lumbar   Left   Intact: light touch    Right   Intact: light touch    Knee   Left Knee   Intact: Light touch    Right Knee   Intact: light touch     Reflexes   Left   Patellar (L4): normal (2+)  Clonus sign: negative    Right   Patellar (L4): normal (2+)  Clonus sign: negative    Additional Neurological Details  (-) guajardo    Active Range of Motion     Lumbar   Flexion:  Restriction level: minimal  Extension:  Restriction level: moderate  Left lateral flexion:  Restriction level: minimal  Right lateral flexion:  Restriction level: minimal  Left Knee   Flexion: 120 degrees   Extension: -5 degrees     Right Knee   Normal active range of motion    Additional Active Range of Motion Details  No change in symptoms with repeated motions    Strength/Myotome Testing     Left Hip   Planes of Motion   Flexion: 3+  Extension: 4  Adduction: 4-  Internal rotation: 4    Right Hip   Planes of Motion   Flexion: 4  Extension: 4+  Adduction: 4  Internal rotation: 4    Left Knee   Flexion: 4+  Extension: 4-  Quadriceps contraction: fair    Right Knee   Flexion: 4+  Extension: 4  Quadriceps contraction: fair    Left Ankle/Foot   Dorsiflexion: 5  Plantar flexion: 5  Great toe extension: 5    Right Ankle/Foot   Dorsiflexion: 5  Plantar flexion: 5  Great toe extension: 5    General Comments:    Lower quarter screen   Hips: unremarkable  Foot/ankle: unremarkable    Knee Comments  Weakness noted in L2-3  Gait: trendelenberg with circumduction on left side lacking knee flexion.                POC Expires Auth Status Start Date Expiration Date PT Visit Limit    7/12 N/a 6/12     Date 6/12       Used 1       Remaining           Diagnosis:  L leg weakness   Precautions:  none   Comparable signs 1) walking  2) squat   Primary Impairments: 1) quad  and hip strength  2) decreased knee ROM   Patient Goals Improve strength   Manual Therapy 6/12                                            Re-evaluation          Exercise Diary          Therapeutic Exercise         Bike         Calf stretch         Leg press                                             Neuromuscular Re-education         Quad sets          TKE         SLR          Bridge                                             Therapeutic Activities         Education  POC, diagnosis, expecations                                            Modalities

## 2024-06-13 ENCOUNTER — CONSULT (OUTPATIENT)
Dept: PAIN MEDICINE | Facility: CLINIC | Age: 69
End: 2024-06-13
Payer: COMMERCIAL

## 2024-06-13 ENCOUNTER — APPOINTMENT (OUTPATIENT)
Dept: RADIOLOGY | Facility: CLINIC | Age: 69
End: 2024-06-13
Payer: COMMERCIAL

## 2024-06-13 VITALS
HEIGHT: 72 IN | HEART RATE: 79 BPM | DIASTOLIC BLOOD PRESSURE: 84 MMHG | WEIGHT: 254 LBS | BODY MASS INDEX: 34.4 KG/M2 | SYSTOLIC BLOOD PRESSURE: 132 MMHG

## 2024-06-13 DIAGNOSIS — M48.062 SPINAL STENOSIS OF LUMBAR REGION WITH NEUROGENIC CLAUDICATION: Primary | ICD-10-CM

## 2024-06-13 DIAGNOSIS — M47.26 OSTEOARTHRITIS OF SPINE WITH RADICULOPATHY, LUMBAR REGION: ICD-10-CM

## 2024-06-13 PROCEDURE — 72100 X-RAY EXAM L-S SPINE 2/3 VWS: CPT

## 2024-06-13 PROCEDURE — 99204 OFFICE O/P NEW MOD 45 MIN: CPT | Performed by: STUDENT IN AN ORGANIZED HEALTH CARE EDUCATION/TRAINING PROGRAM

## 2024-06-13 NOTE — PROGRESS NOTES
Assessment:  1. Spinal stenosis of lumbar region with neurogenic claudication    2. Osteoarthritis of spine with radiculopathy, lumbar region    Patient is a pleasant 69-year-old male who presents as a new patient visit after being referred by Dr. Oconnor for low back pain with bilateral radicular symptoms.  Patient also with chronic left knee pain.  Over the past month the intensity pain has been mild and he rates pain currently as a 1 out of 10 on numeric rating scale.  The pain occurs intermittently and there is no pattern to when symptoms are better or worse.  Describes the pain as numbing, dull and aching with some associated weakness in the lower extremities.  He does not use any assistive devices.  Activities that increases pain include prayer, standing, bending, walking, exercise.  Patient's most recent imaging of his lumbar spine is from 2016 which showed multilevel degenerative disc disease with facet arthropathy at L3-L4 and L4-L5.  Patient had no prior surgeries or injections.  Patient started physical therapy this week.  He has not done any other pain treatments and takes ibuprofen occasionally.    Further discussion with patient he states years ago he was told he has a calcium spur in the middle of his spine which is the  cause his symptoms.  Patient reporting mainly symptoms of lumbar stenosis with neurogenic claudication.  Patient reporting difficulty with standing for any period of time.  He states is not causing him pain though just numbness in his legs and when he moves he is okay.  Patient counseled to continue with physical therapy as he is doing I will get an x-ray of the lumbar spine today to further evaluate.  In 6 weeks patient counseled to contact the office sooner MRI of the lumbar spine if symptoms persist to further evaluate his symptoms of lumbar stenosis with neurogenic claudication.  Plan:  XR Lumbar spine  Physical therapy   Follow up in six weeks for MRI of lumbar spine if symptoms  persist   Orders Placed This Encounter   Procedures   • XR spine lumbar 2 or 3 views injury     Standing Status:   Future     Standing Expiration Date:   6/13/2028     Scheduling Instructions:      Bring along any outside films relating to this procedure.             No orders of the defined types were placed in this encounter.      My impressions and treatment recommendations were discussed in detail with the patient, who verbalized understanding and had no further questions.      Follow-up is planned in six weeks time or sooner as warranted.  Discharge instructions were provided. I personally saw and examined the patient and I agree with the above discussed plan of care.    History of Present Illness:    Isac Hung is a 69 y.o. male who presents to [facility] for initial evaluation of the above stated pain complaints. The patient has a past medical and chronic pain history as outlined in the assessment section. He was referred by Antonio Oconnor, DO  [address] [facility] [address]  [place] [address].    Patient is a pleasant 69-year-old male who presents as a new patient visit after being referred by Dr. Oconnor for low back pain with bilateral radicular symptoms.  Patient also with chronic left knee pain.  Over the past month the intensity pain has been mild and he rates pain currently as a 1 out of 10 on numeric rating scale.  The pain occurs intermittently and there is no pattern to when symptoms are better or worse.  Describes the pain as numbing, dull and aching with some associated weakness in the lower extremities.  He does not use any assistive devices.  Activities that increases pain include prayer, standing, bending, walking, exercise.  Patient's most recent imaging of his lumbar spine is from 2016 which showed multilevel degenerative disc disease with facet arthropathy at L3-L4 and L4-L5.  Patient had no prior surgeries or injections.  Patient started  physical therapy this week.  He has not done any other pain treatments and takes ibuprofen occasionally.    Review of Systems:    Review of Systems   Constitutional:  Negative for chills and fatigue.   HENT:  Negative for ear pain, mouth sores and sinus pressure.    Eyes:  Negative for pain, redness and visual disturbance.   Respiratory:  Negative for shortness of breath and wheezing.    Cardiovascular:  Negative for chest pain and palpitations.   Gastrointestinal:  Negative for abdominal pain and nausea.   Endocrine: Negative for polyphagia.   Musculoskeletal:  Positive for back pain and gait problem. Negative for arthralgias and neck pain.        Decreased ROM, joint pain min   Skin:  Negative for wound.   Neurological:  Negative for seizures and weakness.   Psychiatric/Behavioral:  Positive for decreased concentration, dysphoric mood and sleep disturbance. The patient is nervous/anxious.            Past Medical History:   Diagnosis Date   • Osteoarthritis of knee 6/26/2015   • Umbilical hernia 3/15/2020       Past Surgical History:   Procedure Laterality Date   • KNEE ARTHROSCOPY Left    • NASAL SINUS SURGERY      Nasal FX Repair   • CT RPR UMBILICAL HRNA 5 YRS/> REDUCIBLE N/A 9/11/2020    Procedure: UMBILICAL HERNIA REPAIR;  Surgeon: Brandin Arizmendi MD;  Location: [facility];  Service: General       Family History   Problem Relation Age of Onset   • Scleroderma Mother    • No Known Problems Father        Social History     Occupational History   • Not on file   Tobacco Use   • Smoking status: Never   • Smokeless tobacco: Never   Vaping Use   • Vaping status: Never Used   Substance and Sexual Activity   • Alcohol use: Yes     Comment: occasional alcohol use   • Drug use: Not Currently     Types: Marijuana   • Sexual activity: Not on file         Current Outpatient Medications:   •  sertraline (ZOLOFT) 25 mg tablet, Take 1 tablet (25 mg total) by mouth daily (Patient not taking: Reported on 6/6/2024), Disp: 30  tablet, Rfl: 1    No Known Allergies    Physical Exam:    /84   Pulse 79   Ht 6' (1.829 m)   Wt 115 kg (254 lb)   BMI 34.45 kg/m²     Constitutional: normal, well developed, well nourished, alert, in no distress and non-toxic and no overt pain behavior.  Eyes: anicteric  HEENT: grossly intact  Neck: supple, symmetric, trachea midline and no masses   Pulmonary:even and unlabored  Cardiovascular:No edema or pitting edema present  Skin:Normal without rashes or lesions and well hydrated  Psychiatric:Mood and affect appropriate  Neurologic:Cranial Nerves II-XII grossly intact  Musculoskeletal:normal gait. Brace on left knee.     Imaging  XR spine lumbar 2 or 3 views injury    (Results Pending)       Orders Placed This Encounter   Procedures   • XR spine lumbar 2 or 3 views injury

## 2024-06-14 ENCOUNTER — OFFICE VISIT (OUTPATIENT)
Dept: PHYSICAL THERAPY | Facility: CLINIC | Age: 69
End: 2024-06-14
Payer: COMMERCIAL

## 2024-06-14 DIAGNOSIS — R29.898 LEFT LEG WEAKNESS: ICD-10-CM

## 2024-06-14 DIAGNOSIS — M17.12 PRIMARY OSTEOARTHRITIS OF LEFT KNEE: Primary | ICD-10-CM

## 2024-06-14 PROCEDURE — 97110 THERAPEUTIC EXERCISES: CPT

## 2024-06-14 PROCEDURE — 97112 NEUROMUSCULAR REEDUCATION: CPT

## 2024-06-14 NOTE — PROGRESS NOTES
Daily Note     Today's date: 2024  Patient name: Isac Hung  : 1955  MRN: [id number]  Referring provider: Antonio Oconnor DO  Dx:   Encounter Diagnosis     ICD-10-CM    1. Primary osteoarthritis of left knee  M17.12       2. Left leg weakness  R29.898           Subjective: Reports mild knee pain upon arrival. Main complaint is of weakness. Wearing knee sleeve brace upon arrival, states he wears this most of the time.      Objective: See treatment diary below      Assessment: Did demo decreased quad firing and noted weakness with SLR flexion with resulting quad lag. Reports knee weakness and buckling with SLR abduction with noted discomfort, transitioned to clamshells. Cues during leg press for controlled movements, and focusing on achieving TKE. Good effort and noted fatigue by end of session. Updated HEP.       Plan: Continue per plan of care.  Progress treatment as tolerated.         POC Expires Auth Status Start Date Expiration Date PT Visit Limit     N/a      Date       Used 1 2      Remaining           Diagnosis:  L leg weakness   Precautions:  none   Comparable signs 1) walking  2) squat   Primary Impairments: 1) quad and hip strength  2) decreased knee ROM   Patient Goals Improve strength   Manual Therapy                                            Re-evaluation          Exercise Diary          Therapeutic Exercise         Bike  5'       Calf stretch  10''x10       Leg press  30#, 2x10       Hamstring stretch  10''x10                                  Neuromuscular Re-education         Quad sets   5'', 10x       TKE  Standing blue TB 2x10       clamshells  2x10       SLR   Flex 2x10  S/l abd-attempted       Bridge                                             Therapeutic Activities         Education  POC, diagnosis, expecations                                            Modalities

## 2024-06-19 ENCOUNTER — OFFICE VISIT (OUTPATIENT)
Dept: PHYSICAL THERAPY | Facility: CLINIC | Age: 69
End: 2024-06-19
Payer: COMMERCIAL

## 2024-06-19 DIAGNOSIS — M17.12 PRIMARY OSTEOARTHRITIS OF LEFT KNEE: Primary | ICD-10-CM

## 2024-06-19 DIAGNOSIS — R29.898 LEFT LEG WEAKNESS: ICD-10-CM

## 2024-06-19 PROCEDURE — 97110 THERAPEUTIC EXERCISES: CPT

## 2024-06-19 PROCEDURE — 97112 NEUROMUSCULAR REEDUCATION: CPT

## 2024-06-19 NOTE — PROGRESS NOTES
Daily Note     Today's date: 2024  Patient name: Isac Hung  : 1955  MRN: [id number]  Referring provider: Antonio Oconnor DO  Dx:   Encounter Diagnosis     ICD-10-CM    1. Primary osteoarthritis of left knee  M17.12       2. Left leg weakness  R29.898           Subjective: Reports usual knee pain upon arrival. Has only completed HEP once since last session.     Objective: See treatment diary below      Assessment: Demo improved quad firing and strength with SLR flexion. Challenged and fatigue appropriately with strengthening. Did note weakness and fatigue with LAQ iso, reported feeling like his knee could buckle. Offered no c/o pain during or post tx. Spoke with and educated on safe return to gym routine, beginning with lighter weights and avoiding pain. Plan to incorporate more functional strengthening as able.       Plan: Continue per plan of care.  Progress treatment as tolerated.         POC Expires Auth Status Start Date Expiration Date PT Visit Limit     N/a      Date      Used 1 2 3     Remaining           Diagnosis:  L leg weakness   Precautions:  none   Comparable signs 1) walking  2) squat   Primary Impairments: 1) quad and hip strength  2) decreased knee ROM   Patient Goals Improve strength   Manual Therapy                                           Re-evaluation          Exercise Diary          Therapeutic Exercise         Bike  5' L5 5min      Calf stretch  10''x10       Leg press  30#, 2x10 60#, 2x10      Hamstring stretch  10''x10       HS curl   OSB 2x10                        Neuromuscular Re-education         Quad sets   5'', 10x 5'', 10x      TKE  Standing blue TB 2x10 Standing blue TB 2x10      clamshells  2x10 2x10      SLR   Flex 2x10  S/l abd-attempted Flex 2x10      Bridge   2x10      LAQ iso   Blue TB 2x10                                 Therapeutic Activities         Education  POC, diagnosis, expecations                                             Modalities

## 2024-06-20 ENCOUNTER — TELEPHONE (OUTPATIENT)
Dept: PAIN MEDICINE | Facility: CLINIC | Age: 69
End: 2024-06-20

## 2024-06-20 DIAGNOSIS — M47.816 LUMBAR FACET ARTHROPATHY: Primary | ICD-10-CM

## 2024-06-20 NOTE — TELEPHONE ENCOUNTER
S/w pt and advised of the same, pt needs PT referral for lumbar spine.  Pt goes to PT for knee at this time.  Nurse advised pt nurse to request PT from [facility].Pt verbalized understanding and appreciative of call.

## 2024-06-20 NOTE — TELEPHONE ENCOUNTER
----- Message from Willem Eldridge MD sent at 6/20/2024 10:11 AM EDT -----  Patient with multilevel DDD most severe at L4-L5 and L5-S1 as expected. No change in management at this time.

## 2024-06-21 ENCOUNTER — OFFICE VISIT (OUTPATIENT)
Dept: PHYSICAL THERAPY | Facility: CLINIC | Age: 69
End: 2024-06-21
Payer: COMMERCIAL

## 2024-06-21 DIAGNOSIS — R29.898 LEFT LEG WEAKNESS: ICD-10-CM

## 2024-06-21 DIAGNOSIS — M17.12 PRIMARY OSTEOARTHRITIS OF LEFT KNEE: Primary | ICD-10-CM

## 2024-06-21 PROCEDURE — 97530 THERAPEUTIC ACTIVITIES: CPT

## 2024-06-21 PROCEDURE — 97112 NEUROMUSCULAR REEDUCATION: CPT

## 2024-06-21 PROCEDURE — 97110 THERAPEUTIC EXERCISES: CPT

## 2024-06-21 NOTE — PROGRESS NOTES
Daily Note     Today's date: 2024  Patient name: Isac Hung  : 1955  MRN: [id number]  Referring provider: Antonio Oconnor DO  Dx:   Encounter Diagnosis     ICD-10-CM    1. Primary osteoarthritis of left knee  M17.12       2. Left leg weakness  R29.898           Subjective: Patient reports a little more pain than usual in L knee, rates as 2/10. Wearing knee brace upon arrival. Pt also inquired about treating his back in PT.    Objective: See treatment diary below      Assessment: Performed session without use of knee brace. Spoke with and educated about TrA recruitment and engagement with exercises. Challenged and fatigue appropriately with strengthening. Still has reports of feeling like his knee could buckle. Cues during leg press for slow and controlled extension. Very challenged with step ups, needing to complete on 2'' step due to weakness. Pt demo compensatory measures through hip flexors, focused and educated on quad isolation. Offered no c/o pain during or post tx. Notes weakness and soreness from strength training. Plan to complete evaluation to add back into POC when able.      Plan: Continue per plan of care.  Progress treatment as tolerated.         POC Expires Auth Status Start Date Expiration Date PT Visit Limit     N/a      Date     Used 1 2 3 4    Remaining           Diagnosis:  L leg weakness   Precautions:  none   Comparable signs 1) walking  2) squat   Primary Impairments: 1) quad and hip strength  2) decreased knee ROM   Patient Goals Improve strength   Manual Therapy                                          Re-evaluation          Exercise Diary          Therapeutic Exercise         Bike  5' L5 5min L5 5min     Calf stretch  10''x10       Leg press  30#, 2x10 60#, 2x10 DL 75#, 3x10  SL 30# 2x10     Hamstring stretch  10''x10       HS curl   OSB 2x10 OSB 2x10                       Neuromuscular Re-education         Quad sets   5'', 10x  5'', 10x      TKE  Standing blue TB 2x10 Standing blue TB 2x10      clamshells  2x10 2x10 2x10     SLR   Flex 2x10  S/l abd-attempted Flex 2x10 Flex 2x10     Bridge   2x10 OSB 2x10     LAQ iso   Blue TB 2x10 Blue TB 2x19     SLS    30''x3                       Therapeutic Activities         Education  POC, diagnosis, expecations        Step ups    2'', fwd-10x     squats    2x10                       Modalities

## 2024-06-26 ENCOUNTER — OFFICE VISIT (OUTPATIENT)
Dept: PHYSICAL THERAPY | Facility: CLINIC | Age: 69
End: 2024-06-26
Payer: COMMERCIAL

## 2024-06-26 DIAGNOSIS — R29.898 LEFT LEG WEAKNESS: ICD-10-CM

## 2024-06-26 DIAGNOSIS — M17.12 PRIMARY OSTEOARTHRITIS OF LEFT KNEE: Primary | ICD-10-CM

## 2024-06-26 PROCEDURE — 97530 THERAPEUTIC ACTIVITIES: CPT

## 2024-06-26 PROCEDURE — 97110 THERAPEUTIC EXERCISES: CPT

## 2024-06-26 PROCEDURE — 97112 NEUROMUSCULAR REEDUCATION: CPT

## 2024-06-26 NOTE — PROGRESS NOTES
Daily Note     Today's date: 2024  Patient name: Isac Hung  : 1955  MRN: [id number]  Referring provider: Antonio Oconnor DO  Dx:   Encounter Diagnosis     ICD-10-CM    1. Primary osteoarthritis of left knee  M17.12       2. Left leg weakness  R29.898           Subjective: Patient reports a little more pain in knee, but states that he isnt wearing brace. Pt reporting knee stiffness upon arrival.    Objective: See treatment diary below      Assessment: Attempting combining bridge with hamstring curls, but pt unable to complete due to knee pain and weakness. Challenged and fatigue appropriately with strengthening.  Cues during leg press for slow and controlled extension. Continues to be challenged with step ups due to weakness. Pt demo less compensatory measures today but still evident. Challenged with lunges due to strength deficits. Difficulty with proper form. Cues overall to keep chest upright. Offered no c/o pain during or post tx. Notes weakness and soreness from strength training.       Plan: Continue per plan of care.  Progress treatment as tolerated.         POC Expires Auth Status Start Date Expiration Date PT Visit Limit     N/a      Date    Used 1 2 3 4 5   Remaining           Diagnosis:  L leg weakness   Precautions:  none   Comparable signs 1) walking  2) squat   Primary Impairments: 1) quad and hip strength  2) decreased knee ROM   Patient Goals Improve strength   Manual Therapy                                         Re-evaluation          Exercise Diary          Therapeutic Exercise         Bike  5' L5 5min L5 5min 5 min    Calf stretch  10''x10       Leg press  30#, 2x10 60#, 2x10 DL 75#, 3x10  SL 30# 2x10 DL 85# 3x10  SL 40#, 2x10    Hamstring stretch  10''x10       HS curl   OSB 2x10 OSB 2x10 OSB 2x10                      Neuromuscular Re-education         Quad sets   5'', 10x 5'', 10x      TKE  Standing blue TB 2x10 Standing  blue TB 2x10      clamshells  2x10 2x10 2x10 2x10    SLR   Flex 2x10  S/l abd-attempted Flex 2x10 Flex 2x10 Flex 2x10    Bridge   2x10 OSB 2x10 OSB 2x10    LAQ iso   Blue TB 2x10 Blue TB 2x10 Blue 2x10    SLS    30''x3 30''x3                      Therapeutic Activities         Education  POC, diagnosis, expecations        Step ups    2'', fwd-10x 2'', fwd-2x10    squats    2x10 2x10    X walks     Blue TB 2 laps    lunges     10x    Modalities

## 2024-06-28 ENCOUNTER — OFFICE VISIT (OUTPATIENT)
Dept: PHYSICAL THERAPY | Facility: CLINIC | Age: 69
End: 2024-06-28
Payer: COMMERCIAL

## 2024-06-28 DIAGNOSIS — M17.12 PRIMARY OSTEOARTHRITIS OF LEFT KNEE: Primary | ICD-10-CM

## 2024-06-28 DIAGNOSIS — R29.898 LEFT LEG WEAKNESS: ICD-10-CM

## 2024-06-28 PROCEDURE — 97530 THERAPEUTIC ACTIVITIES: CPT

## 2024-06-28 PROCEDURE — 97110 THERAPEUTIC EXERCISES: CPT

## 2024-06-28 PROCEDURE — 97112 NEUROMUSCULAR REEDUCATION: CPT

## 2024-06-28 NOTE — PROGRESS NOTES
Daily Note     Today's date: 2024  Patient name: Isac Hung  : 1955  MRN: [id number]  Referring provider: Antonio Oconnor DO  Dx:   Encounter Diagnosis     ICD-10-CM    1. Primary osteoarthritis of left knee  M17.12       2. Left leg weakness  R29.898           Subjective: Patient reports knee bothering him today, wearing his brace upon arrival. Feels about the same in regards to pain and strength. Has not been compliant with HEP as much as he should.    Objective: See treatment diary below      Assessment: Requested to keep knee brace on for therapy session today. Challenged and fatigue appropriately with strengthening. Continues to be challenged with step ups due to weakness. Pt continued demo less compensatory measures to complete. Difficulty completing with proper form on quad emphasis. Cues overall to keep chest upright. Notes weakness and soreness from strength training.       Plan: Continue per plan of care.  Progress treatment as tolerated.         POC Expires Auth Status Start Date Expiration Date PT Visit Limit     N/a      Date    Used 6 2 3 4 5   Remaining           Diagnosis:  L leg weakness   Precautions:  none   Comparable signs 1) walking  2) squat   Primary Impairments: 1) quad and hip strength  2) decreased knee ROM   Patient Goals Improve strength   Manual Therapy                                        Re-evaluation          Exercise Diary          Therapeutic Exercise         Bike  5' L5 5min L5 5min 5 min 5 min   Calf stretch  10''x10       Leg press  30#, 2x10 60#, 2x10 DL 75#, 3x10  SL 30# 2x10 DL 85# 3x10  SL 40#, 2x10 # 3x10  SL 50# 3x10     Hamstring stretch  10''x10       HS curl   OSB 2x10 OSB 2x10 OSB 2x10 OSB 2x10                     Neuromuscular Re-education         Quad sets   5'', 10x 5'', 10x      TKE  Standing blue TB 2x10 Standing blue TB 2x10      clamshells  2x10 2x10 2x10 2x10 2x10   SLR   Flex  2x10  S/l abd-attempted Flex 2x10 Flex 2x10 Flex 2x10 Flex 2x10   Bridge   2x10 OSB 2x10 OSB 2x10 OSB 2x10   LAQ iso   Blue TB 2x10 Blue TB 2x10 Blue 2x10 Blue 2x10   SLS    30''x3 30''x3                      Therapeutic Activities         Education  POC, diagnosis, expecations        Step ups    2'', fwd-10x 2'', fwd-2x10 2'', fwd-10x   squats    2x10 2x10 2x10   X walks     Blue TB 2 laps Blue TB 2 laps   lunges     10x 2x10   Modalities

## 2024-07-03 ENCOUNTER — EVALUATION (OUTPATIENT)
Dept: PHYSICAL THERAPY | Facility: CLINIC | Age: 69
End: 2024-07-03
Payer: COMMERCIAL

## 2024-07-03 DIAGNOSIS — R29.898 LEFT LEG WEAKNESS: ICD-10-CM

## 2024-07-03 DIAGNOSIS — M17.12 PRIMARY OSTEOARTHRITIS OF LEFT KNEE: Primary | ICD-10-CM

## 2024-07-03 PROCEDURE — 97530 THERAPEUTIC ACTIVITIES: CPT

## 2024-07-03 PROCEDURE — 97162 PT EVAL MOD COMPLEX 30 MIN: CPT

## 2024-07-03 NOTE — PROGRESS NOTES
{SL AMB PT/OT NOTE TYPE:04110}    Today's date: 7/3/2024  Patient name: Isac Hung  : 1955  MRN: [id number]  Referring provider: Antonio Oconnor DO  Dx:   Encounter Diagnosis     ICD-10-CM    1. Primary osteoarthritis of left knee  M17.12       2. Left leg weakness  R29.898                      Assessment/Plan    Subjective    Objective           Precautions: ***      Manuals                                                                 Neuro Re-Ed                                                                                                        Ther Ex                                                                                                                     Ther Activity                                       Gait Training                                       Modalities

## 2024-07-03 NOTE — PROGRESS NOTES
PT Evaluation     Today's date: 7/3/2024  Patient name: Isac Hung  : 1955  MRN: [id number]  Referring provider: Antonio Oconnor DO  Dx:   Encounter Diagnosis     ICD-10-CM    1. Primary osteoarthritis of left knee  M17.12       2. Left leg weakness  R29.898                      Assessment  Impairments: abnormal coordination, abnormal gait, abnormal muscle firing, abnormal or restricted ROM, abnormal movement, activity intolerance, impaired balance, impaired physical strength, lacks appropriate home exercise program, pain with function, safety issue, weight-bearing intolerance and poor body mechanics    Assessment details: IE back on 7/3/24 Isac Hung is a pleasant 69 y.o. male presents with signs and symptoms consistent with:   Low back pain  Left leg weakness    Problem List:  1) adverse neural tension   2) hypomobility of lumbar spine.     Comparable signs:  1) walking  2) standing    he has hypomobility of lumbar spine, and adverse neural tension which is contributing to his back pain resulting in worry over not knowing what's wrong and fear of not being able to keep active. These impairments listed above are preventing the patient from participating in functional activity. No further referral appears necessary at this time based upon examination results, and is negative for any red flags. I expect he will do okay based off good prognosis, HEP compliance and attendance to physical therapy 2x a week.  Positive prognostic indicators include positive attitude toward recovery and good understanding of diagnosis and treatment plan options.  Negative prognostic indicators include chronicity of symptoms, depression, and hypertension.  Patent will benefit from skilled physical therapy at this time to address deficits to improve overall function and return to PLOF. Patient verbalized understanding of POC, HEP, and return demonstrated HEP. All questions were answered to patients satisfaction.     Please  contact me if you have any questions or recommendations. Thank you for the referral and the opportunity to share in Isac Hung's care.          RE 7/3/24 Isac presents to re-evaluation for his knee. He has improved strength and ROM. He still lacks hip and core strength secondary weakness in both his hip and back. He will benefit from PT and HEP to this date.     IE:Isac Hung is a pleasant 69 y.o. male presents with signs and symptoms consistent with:   Primary osteoarthritis of left knee  Left leg weakness    Problem List:  1) quad and glute strength  2) knee ROM    Comparable signs:  1) walking  2) squatting    he has decreased ROM, decreased strength and impaired function resulting in worry over not knowing what's wrong, wanting to avoid surgery, and fear of not being able to keep active. These impairments listed above are preventing the patient from participating in functional activity. No further referral appears necessary at this time based upon examination results, and is negative for any red flags. Prognosis is good based off HEP compliance and attendance to physical therapy 2x a week.  Positive prognostic indicators include positive attitude toward recovery and good understanding of diagnosis and treatment plan options.  Negative prognostic indicators include chronicity of symptoms, anxiety, depression.  Patent will benefit from skilled physical therapy at this time to address deficits to improve overall function and return to PLOF. Patient verbalized understanding of POC, HEP, and return demonstrated HEP. All questions were answered to patients satisfaction. He will benefit from 1 more week to improve ROM.     Please contact me if you have any questions or recommendations. Thank you for the referral and the opportunity to share in Isac Hung's care.              Understanding of Dx/Px/POC: good     Prognosis: good    Goals  Impairment Goals 4-6 weeks  In order to improve and maximize function  patient will be able to...  - Decrease pain intensity to <2/10  - Improve lumbar AROM to >100% throughout  - Increase hip strength to 5/5 throughout  - Increase core strength in order to improve lumbar stabilization as demonstrated through progressive exercises.  - improve core, glute and multifidi stabilization as seen by decreased pelvic obliquity with exercises.  - Centralize symptoms and decrease numbness frequency/duration    Functional Goals 6-8 weeks  In order to improve and maximize function patient will be able to...  - Return to Prior Level of Function with no greater than 2/10 pain   - Increase Functional Status Measure (FOTO) to: >predicted outcomes  - Be independent and compliant with HEP  - Tolerate sitting and or standing without increased pain/compensation/difficulty for at least 30 minutes  - Perform sit to stand without increased pain/compensation/difficulty   - Demonstrate bend forward without increased pain/compensation/difficulty   - Ambulate with proper mechanics without compensations in pain.       Plan  Patient would benefit from: skilled PT  Planned modality interventions: cryotherapy, electrical stimulation/Russian stimulation, TENS and thermotherapy: hydrocollator packs    Planned therapy interventions: joint mobilization, manual therapy, neuromuscular re-education, patient education, strengthening, stretching, therapeutic activities, therapeutic exercise, home exercise program, functional ROM exercises, Eastman taping, postural training, gait training, balance, balance/weight bearing training, flexibility, graded exercise and motor coordination training    Frequency: 2x week  Duration in weeks: 8  Treatment plan discussed with: patient        Subjective Evaluation    History of Present Illness  Mechanism of injury: IE for the back 7/3/24. Patient reports that he has a history of low back pain. He reports that occasionally he has some issues and some pain throughout the day. Denies some  numbness and tingling, and bowl and bladder issues. He reports standing in one spot for too long increases pain. He reports that longer he stands the more numbness he feels and he gets pain.     RE: 7/3/24 Patient presents to PT today with L knee pain with 50% improvement. He reports that the knee has been being more stronger. He states he is not using the brace is helpful. He reports that he still struggles with stairs. He did state that he still struggles emotionally with current situation.     IE:Patient presents to PT with L knee pain that has been ongoing. He reports that he has had an arthroscopic clean up many years ago. Patient reports that he was supposed to have a knee replacement a couple years ago. He reports not a lot of pain. He states about 8 months ago he reports that he feels that his knee has gotten noticeably weaker. He currently wears a brace and feels that his knee is weak. Goes up steps on all 4s. He reports difficulty with standing for a long period of time due to low back.       Patient did report recent life events of his wife passing.   Difficulty with: going up stairs, lifting heavy, walking downhill  Patient Goals  Patient goals for therapy: decreased pain and independence with ADLs/IADLs  Patient goal: be able to lift weights, get this as strong as possible back: help the leg as much possible (progressing,)  Pain  Current pain ratin  At worst pain ratin (back 4)  Location: medial knee  Quality: tight and sharp  Relieving factors: medications  Aggravating factors: walking, standing and stair climbing        Objective     Concurrent Complaints  Negative for night pain, disturbed sleep, bladder dysfunction, bowel dysfunction and saddle (S4) numbness    Neurological Testing     Sensation     Lumbar   Left   Intact: light touch    Right   Intact: light touch    Knee   Left Knee   Intact: Light touch    Right Knee   Intact: light touch     Reflexes   Left   Patellar (L4): normal  (2+)  Clonus sign: negative    Right   Patellar (L4): normal (2+)  Clonus sign: negative    Additional Neurological Details  (-) guajardo    Active Range of Motion   Cervical/Thoracic Spine       Thoracic    Extension:  Restriction level: moderate  Left lateral flexion:  Restriction level: minimal  Right lateral flexion:  Restriction level: minimal  Left rotation:  Restriction level: moderate  Right rotation:  Restriction level: moderate    Lumbar   Flexion:  Restriction level: minimal  Extension:  Restriction level: moderate  Left Knee   Flexion: 120 degrees   Extension: -5 degrees     Right Knee   Normal active range of motion    Additional Active Range of Motion Details  Change with extension     Strength/Myotome Testing     Left Hip   Planes of Motion   Flexion: 4-  Extension: 4+  Adduction: 4  Internal rotation: 4+    Right Hip   Planes of Motion   Flexion: 4+  Extension: 5  Adduction: 4+  Internal rotation: 4+    Left Knee   Flexion: 4+  Extension: 4  Quadriceps contraction: fair    Right Knee   Flexion: 4+  Extension: 4  Quadriceps contraction: fair    Left Ankle/Foot   Dorsiflexion: 5  Plantar flexion: 5  Great toe extension: 5    Right Ankle/Foot   Dorsiflexion: 5  Plantar flexion: 5  Great toe extension: 5    Tests     Lumbar     Left   Positive passive SLR and slump test.   Negative crossed SLR.     Right   Positive passive SLR and slump test.   Negative crossed SLR.     Left Hip   Positive DANY and FADIR.     Right Hip   Positive DANY and FADIR.     General Comments:    Lower quarter screen   Hips: unremarkable  Foot/ankle: unremarkable    Knee Comments  Weakness noted in L2-3  Gait: trendelenberg with circumduction on left side lacking knee flexion.   Graphical documentation:                    POC Expires Auth Status Start Date Expiration Date PT Visit Limit    7/12 N/a 6/12     Date 6/28 6/14 6/19 6/21 6/26   Used 6 2 3 4 5   Remaining           Diagnosis:  L leg weakness   Precautions:  none    Comparable signs 1) walking  2) squat   Primary Impairments: 1) quad and hip strength  2) decreased knee ROM   Patient Goals Improve strength   Manual Therapy 6/12 6/14 6/19 6/21 6/26 6/28                                       Re-evaluation  SP/IE        Exercise Diary          Therapeutic Exercise         Bike  5' L5 5min L5 5min 5 min 5 min   Calf stretch  10''x10       Leg press  30#, 2x10 60#, 2x10 DL 75#, 3x10  SL 30# 2x10 DL 85# 3x10  SL 40#, 2x10 # 3x10  SL 50# 3x10     Hamstring stretch  10''x10       HS curl   OSB 2x10 OSB 2x10 OSB 2x10 OSB 2x10                     Neuromuscular Re-education         Quad sets   5'', 10x 5'', 10x      TKE  Standing blue TB 2x10 Standing blue TB 2x10      clamshells  2x10 2x10 2x10 2x10 2x10   SLR   Flex 2x10  S/l abd-attempted Flex 2x10 Flex 2x10 Flex 2x10 Flex 2x10   Bridge   2x10 OSB 2x10 OSB 2x10 OSB 2x10   LAQ iso   Blue TB 2x10 Blue TB 2x10 Blue 2x10 Blue 2x10   SLS    30''x3 30''x3                      Therapeutic Activities         Education  POC, diagnosis, expecations        Step ups    2'', fwd-10x 2'', fwd-2x10 2'', fwd-10x   squats    2x10 2x10 2x10   X walks     Blue TB 2 laps Blue TB 2 laps   lunges     10x 2x10   Modalities

## 2024-07-05 ENCOUNTER — APPOINTMENT (OUTPATIENT)
Dept: PHYSICAL THERAPY | Facility: CLINIC | Age: 69
End: 2024-07-05
Payer: COMMERCIAL

## 2024-07-09 ENCOUNTER — OFFICE VISIT (OUTPATIENT)
Dept: PHYSICAL THERAPY | Facility: CLINIC | Age: 69
End: 2024-07-09
Payer: COMMERCIAL

## 2024-07-09 DIAGNOSIS — R29.898 LEFT LEG WEAKNESS: ICD-10-CM

## 2024-07-09 DIAGNOSIS — M17.12 PRIMARY OSTEOARTHRITIS OF LEFT KNEE: Primary | ICD-10-CM

## 2024-07-09 PROCEDURE — 97530 THERAPEUTIC ACTIVITIES: CPT

## 2024-07-09 PROCEDURE — 97110 THERAPEUTIC EXERCISES: CPT

## 2024-07-09 NOTE — PROGRESS NOTES
Daily Note     Today's date: 2024  Patient name: Isac Hung  : 1955  MRN: [id number]  Referring provider: Antonio Oconnor DO  Dx:   Encounter Diagnosis     ICD-10-CM    1. Primary osteoarthritis of left knee  M17.12       2. Left leg weakness  R29.898             Subjective: Feeling stiff upon arrival.      Objective: See treatment diary below      Assessment: Completed some stretching for back and core stability today with good tolerance. Pt did have more reports of L knee pain after a few reps on leg press, limiting performance of reps and exercises performed. Would continue to benefit from PT for core and LE strengthening.       Plan: Continue per plan of care.  Progress treament per protocol.        POC Expires Auth Status Start Date Expiration Date PT Visit Limit     N/a      Date 6/28 7/3 7/9 6/21 6/26   Used 6 7 8  4 5   Remaining           Diagnosis:  L leg weakness   Precautions:  none   Comparable signs 1) walking  2) squat   Primary Impairments: 1) quad and hip strength  2) decreased knee ROM   Patient Goals Improve strength   Manual Therapy                                        Re-evaluation          Exercise Diary          Therapeutic Exercise         Bike 5' 5' L5 5min L5 5min 5 min 5 min   Calf stretch  10''x10       Leg press # 3x10  SL-60#-7x, unable 30#, 2x10 60#, 2x10 DL 75#, 3x10  SL 30# 2x10 DL 85# 3x10  SL 40#, 2x10 # 3x10  SL 50# 3x10     Hamstring stretch  10''x10       HS curl OSB 2x10  OSB 2x10 OSB 2x10 OSB 2x10 OSB 2x10   LTR 10''x10                 Neuromuscular Re-education         Quad sets   5'', 10x 5'', 10x      TKE  Standing blue TB 2x10 Standing blue TB 2x10      clamshells  2x10 2x10 2x10 2x10 2x10   SLR   Flex 2x10  S/l abd-attempted Flex 2x10 Flex 2x10 Flex 2x10 Flex 2x10   Bridge OSB 2x10  2x10 OSB 2x10 OSB 2x10 OSB 2x10   LAQ iso   Blue TB 2x10 Blue TB 2x10 Blue 2x10 Blue 2x10   SLS    30''x3 30''x3                       Therapeutic Activities         Education          Step ups    2'', fwd-10x 2'', fwd-2x10 2'', fwd-10x   squats 2x10   2x10 2x10 2x10   X walks BTB 2 laps    Blue TB 2 laps Blue TB 2 laps   lunges 10x    10x 2x10   Modalities

## 2024-07-10 ENCOUNTER — APPOINTMENT (OUTPATIENT)
Dept: PHYSICAL THERAPY | Facility: CLINIC | Age: 69
End: 2024-07-10
Payer: COMMERCIAL

## 2024-07-12 ENCOUNTER — OFFICE VISIT (OUTPATIENT)
Dept: PHYSICAL THERAPY | Facility: CLINIC | Age: 69
End: 2024-07-12
Payer: COMMERCIAL

## 2024-07-12 DIAGNOSIS — R29.898 LEFT LEG WEAKNESS: ICD-10-CM

## 2024-07-12 DIAGNOSIS — M17.12 PRIMARY OSTEOARTHRITIS OF LEFT KNEE: Primary | ICD-10-CM

## 2024-07-12 PROCEDURE — 97112 NEUROMUSCULAR REEDUCATION: CPT

## 2024-07-12 PROCEDURE — 97110 THERAPEUTIC EXERCISES: CPT

## 2024-07-12 PROCEDURE — 97530 THERAPEUTIC ACTIVITIES: CPT

## 2024-07-12 NOTE — PROGRESS NOTES
Daily Note     Today's date: 2024  Patient name: Isac Hung  : 1955  MRN: [id number]  Referring provider: Antonio Oconnor DO  Dx:   Encounter Diagnosis     ICD-10-CM    1. Primary osteoarthritis of left knee  M17.12       2. Left leg weakness  R29.898             Subjective: Thinks things are getting better. Notes he is wearing his knee brace less.      Objective: See treatment diary below      Assessment: Able to complete SL leg press at reduced weight without any c/o knee pain. Trialed squats with TRX with noted improvement in form and technique, but does have tendency to weight shifting on RLE, educated in equal Wb'ing. Continued challenge and weakness with lunges, but improvement noted with B UE support compared to u/l. Encouraged incorporating TrA recruitment throughout session. Continues to be unable to complete step ups with compensatory measures due to quad weakness.      Plan: Continue per plan of care.  Progress treament per protocol.        POC Expires Auth Status Start Date Expiration Date PT Visit Limit     N/a      Date 6/28 7/3 7/9 7/12 6/26   Used 6 7 8  9 5   Remaining           Diagnosis:  L leg weakness   Precautions:  none   Comparable signs 1) walking  2) squat   Primary Impairments: 1) quad and hip strength  2) decreased knee ROM   Patient Goals Improve strength   Manual Therapy                                        Re-evaluation          Exercise Diary          Therapeutic Exercise         Bike 5' 5' L5 5min L5 5min 5 min 5 min   Calf stretch         Leg press # 3x10  SL-60#-7x, unable DL 2x10  SL 50# 2x10 60#, 2x10 DL 75#, 3x10  SL 30# 2x10 DL 85# 3x10  SL 40#, 2x10 # 3x10  SL 50# 3x10     Hamstring stretch         HS curl OSB 2x10  OSB 2x10 OSB 2x10 OSB 2x10 OSB 2x10   LTR 10''x10                 Neuromuscular Re-education         Quad sets    5'', 10x      TKE   Standing blue TB 2x10      clamshells   2x10 2x10 2x10 2x10   SLR     Flex 2x10 Flex 2x10 Flex 2x10 Flex 2x10   Bridge OSB 2x10  2x10 OSB 2x10 OSB 2x10 OSB 2x10   LAQ iso   Blue TB 2x10 Blue TB 2x10 Blue 2x10 Blue 2x10   SLS  30''x3  30''x3 30''x3                      Therapeutic Activities         Education          Step ups    2'', fwd-10x 2'', fwd-2x10 2'', fwd-10x   squats 2x10 TRX 2x10  2x10 2x10 2x10   Multif press  Dbl blue 10x ea       X walks BTB 2 laps    Blue TB 2 laps Blue TB 2 laps   lunges 10x 2x10 B  B UE support   10x 2x10   Modalities

## 2024-07-16 ENCOUNTER — OFFICE VISIT (OUTPATIENT)
Dept: PHYSICAL THERAPY | Facility: CLINIC | Age: 69
End: 2024-07-16
Payer: COMMERCIAL

## 2024-07-16 DIAGNOSIS — R29.898 LEFT LEG WEAKNESS: ICD-10-CM

## 2024-07-16 DIAGNOSIS — M17.12 PRIMARY OSTEOARTHRITIS OF LEFT KNEE: Primary | ICD-10-CM

## 2024-07-16 PROCEDURE — 97112 NEUROMUSCULAR REEDUCATION: CPT

## 2024-07-16 PROCEDURE — 97110 THERAPEUTIC EXERCISES: CPT

## 2024-07-16 PROCEDURE — 97530 THERAPEUTIC ACTIVITIES: CPT

## 2024-07-16 NOTE — PROGRESS NOTES
Daily Note     Today's date: 2024  Patient name: Isac Hung  : 1955  MRN: [id number]  Referring provider: Antonio Oconnor DO  Dx:   Encounter Diagnosis     ICD-10-CM    1. Primary osteoarthritis of left knee  M17.12       2. Left leg weakness  R29.898             Subjective: No new c/o in regard to back or knee. Notes minimal compliance with HEP.       Objective: See treatment diary below      Assessment: Pt with continued tendency to weight shifting on RLE with squats, educated in equal Wb'ing. Continued to focused on core and LE strengthening. Pt noting fatigue with standing exercises, transitioned to table exercises. Overall continues to demo core and LE weakness. Education for proper form and technique. Cues also for slow and controlled movements. Appropriately challenged and fatigued.      Plan: Continue per plan of care.  Progress treament per protocol.        POC Expires Auth Status Start Date Expiration Date PT Visit Limit     N/a      Date 6/28 7/3 7/9 7/12 7/16   Used 6 7 8  9 10   Remaining           Diagnosis:  L leg weakness   Precautions:  none   Comparable signs 1) walking  2) squat   Primary Impairments: 1) quad and hip strength  2) decreased knee ROM   Patient Goals Improve strength   Manual Therapy                                        Re-evaluation          Exercise Diary          Therapeutic Exercise         Bike 5' 5' 5' L5 5min 5 min 5 min   Calf stretch         Leg press # 3x10  SL-60#-7x, unable DL 2x10  SL 50# 2x10 # 2x10 DL 75#, 3x10  SL 30# 2x10 DL 85# 3x10  SL 40#, 2x10 # 3x10  SL 50# 3x10     Hamstring stretch         HS curl OSB 2x10  OSB 2x10 OSB 2x10 OSB 2x10 OSB 2x10   LTR 10''x10                 Neuromuscular Re-education         Quad sets          TKE         clamshells   B 2x10 2x10 2x10 2x10   SLR    2x10 Flex 2x10 Flex 2x10 Flex 2x10   Bridge OSB 2x10  OSB 2x10 OSB 2x10 OSB 2x10 OSB 2x10   LAQ iso   Blue TB  2x10 Blue TB 2x10 Blue 2x10 Blue 2x10   SLS  30''x3  30''x3 30''x3                      Therapeutic Activities         Education          Step ups    2'', fwd-10x 2'', fwd-2x10 2'', fwd-10x   squats 2x10 TRX 2x10 TRX 2x10 2x10 2x10 2x10   Multif press  Dbl blue 10x ea Dbl blue 10x ea      X walks BTB 2 laps  BTB 2 laps  Blue TB 2 laps Blue TB 2 laps   lunges 10x 2x10 B  B UE support 2x10 B  B UE support  10x 2x10   Modalities

## 2024-07-17 ENCOUNTER — APPOINTMENT (OUTPATIENT)
Dept: PHYSICAL THERAPY | Facility: CLINIC | Age: 69
End: 2024-07-17
Payer: COMMERCIAL

## 2024-07-19 ENCOUNTER — OFFICE VISIT (OUTPATIENT)
Dept: PHYSICAL THERAPY | Facility: CLINIC | Age: 69
End: 2024-07-19
Payer: COMMERCIAL

## 2024-07-19 DIAGNOSIS — M17.12 PRIMARY OSTEOARTHRITIS OF LEFT KNEE: Primary | ICD-10-CM

## 2024-07-19 DIAGNOSIS — R29.898 LEFT LEG WEAKNESS: ICD-10-CM

## 2024-07-19 PROCEDURE — 97112 NEUROMUSCULAR REEDUCATION: CPT

## 2024-07-19 PROCEDURE — 97110 THERAPEUTIC EXERCISES: CPT

## 2024-07-19 PROCEDURE — 97530 THERAPEUTIC ACTIVITIES: CPT

## 2024-07-19 NOTE — PROGRESS NOTES
"Daily Note     Today's date: 2024  Patient name: Isac Hung  : 1955  MRN: [id number]  Referring provider: Antonio Oconnor DO  Dx:   Encounter Diagnosis     ICD-10-CM    1. Primary osteoarthritis of left knee  M17.12       2. Left leg weakness  R29.898             Subjective: No new c/o in regard to back or knee. Notes minimal compliance with HEP.       Objective: See treatment diary below      Assessment: Pt still demonstrates offloading on L leg to R side due to weakness. He needed verbal cueing to push through the left leg. But did have good depth with squats. He notes challenge with blue LAQ iso difficulty going through full end range. Appropriately challenged and fatigued.       Plan: Continue per plan of care.  Progress treament per protocol.        POC Expires Auth Status Start Date Expiration Date PT Visit Limit     N/a      Date 6/28 7/3 7/9 7/12 7/16   Used 6 7 8  9 10   Remaining           Diagnosis:  L leg weakness   Precautions:  none   Comparable signs 1) walking  2) squat   Primary Impairments: 1) quad and hip strength  2) decreased knee ROM   Patient Goals Improve strength   Manual Therapy                                        Re-evaluation          Exercise Diary          Therapeutic Exercise         Bike 5' 5' 5' 5' min  5 min   Calf stretch         Leg press # 3x10  SL-60#-7x, unable DL 2x10  SL 50# 2x10 # 2x10 # 2x10  SL 50# 2x10  # 3x10  SL 50# 3x10     Hamstring stretch         HS curl OSB 2x10  OSB 2x10 OSB 2x10  OSB 2x10   LTR 10''x10                 Neuromuscular Re-education         Quad sets          TKE         clamshells   B 2x10 B 2x10 GTB   2x10   SLR    2x10 2x10  Flex 2x10   Bridge OSB 2x10  OSB 2x10 OSB 2x10  OSB 2x10   LAQ iso   Blue TB 2x10   Blue 2x10   SLS  30''x3  30\"x3                       Therapeutic Activities         Education          Step ups      2'', fwd-10x   squats 2x10 TRX 2x10 TRX 2x10 TRX 2x10   " 2x10   Multif press  Dbl blue 10x ea Dbl blue 10x ea Dbl blue 10x      X walks BTB 2 laps  BTB 2 laps BTB 2 laps   Blue TB 2 laps   lunges 10x 2x10 B  B UE support 2x10 B  B UE support 2x10 B   2x10   Modalities

## 2024-07-23 ENCOUNTER — APPOINTMENT (OUTPATIENT)
Dept: PHYSICAL THERAPY | Facility: CLINIC | Age: 69
End: 2024-07-23
Payer: COMMERCIAL

## 2024-07-26 ENCOUNTER — APPOINTMENT (OUTPATIENT)
Dept: PHYSICAL THERAPY | Facility: CLINIC | Age: 69
End: 2024-07-26
Payer: COMMERCIAL

## 2024-07-30 ENCOUNTER — APPOINTMENT (OUTPATIENT)
Dept: PHYSICAL THERAPY | Facility: CLINIC | Age: 69
End: 2024-07-30
Payer: COMMERCIAL

## 2024-09-06 ENCOUNTER — OFFICE VISIT (OUTPATIENT)
Dept: URGENT CARE | Facility: CLINIC | Age: 69
End: 2024-09-06
Payer: COMMERCIAL

## 2024-09-06 ENCOUNTER — HOSPITAL ENCOUNTER (OUTPATIENT)
Facility: HOSPITAL | Age: 69
Setting detail: OBSERVATION
Discharge: HOME | End: 2024-09-07
Attending: EMERGENCY MEDICINE | Admitting: STUDENT IN AN ORGANIZED HEALTH CARE EDUCATION/TRAINING PROGRAM
Payer: COMMERCIAL

## 2024-09-06 ENCOUNTER — APPOINTMENT (EMERGENCY)
Dept: RADIOLOGY | Facility: HOSPITAL | Age: 69
End: 2024-09-06
Payer: COMMERCIAL

## 2024-09-06 VITALS
TEMPERATURE: 98 F | BODY MASS INDEX: 34.54 KG/M2 | HEART RATE: 127 BPM | DIASTOLIC BLOOD PRESSURE: 85 MMHG | HEIGHT: 72 IN | SYSTOLIC BLOOD PRESSURE: 126 MMHG | RESPIRATION RATE: 14 BRPM | WEIGHT: 255 LBS | OXYGEN SATURATION: 98 %

## 2024-09-06 DIAGNOSIS — R42 LIGHTHEADEDNESS: ICD-10-CM

## 2024-09-06 DIAGNOSIS — R53.1 WEAKNESS: ICD-10-CM

## 2024-09-06 DIAGNOSIS — R79.89 ELEVATED TROPONIN: ICD-10-CM

## 2024-09-06 DIAGNOSIS — R11.0 NAUSEA: ICD-10-CM

## 2024-09-06 DIAGNOSIS — R06.00 DYSPNEA, UNSPECIFIED TYPE: ICD-10-CM

## 2024-09-06 DIAGNOSIS — R53.1 WEAKNESS: Primary | ICD-10-CM

## 2024-09-06 DIAGNOSIS — R42 LIGHTHEADEDNESS: Primary | ICD-10-CM

## 2024-09-06 DIAGNOSIS — R42 DIZZINESS: ICD-10-CM

## 2024-09-06 PROBLEM — E66.9 OBESITY (BMI 30.0-34.9): Status: ACTIVE | Noted: 2022-10-03

## 2024-09-06 PROBLEM — E66.811 OBESITY (BMI 30.0-34.9): Status: ACTIVE | Noted: 2022-10-03

## 2024-09-06 LAB
2HR DELTA HS TROPONIN: 3 NG/L
4HR DELTA HS TROPONIN: 5 NG/L
ALBUMIN SERPL BCG-MCNC: 4 G/DL (ref 3.5–5)
ALP SERPL-CCNC: 57 U/L (ref 34–104)
ALT SERPL W P-5'-P-CCNC: 13 U/L (ref 7–52)
ANION GAP SERPL CALCULATED.3IONS-SCNC: 9 MMOL/L (ref 4–13)
AST SERPL W P-5'-P-CCNC: 15 U/L (ref 13–39)
BASOPHILS # BLD AUTO: 0.04 THOUSANDS/ÂΜL (ref 0–0.1)
BASOPHILS NFR BLD AUTO: 1 % (ref 0–1)
BILIRUB SERPL-MCNC: 0.53 MG/DL (ref 0.2–1)
BUN SERPL-MCNC: 18 MG/DL (ref 5–25)
CALCIUM SERPL-MCNC: 8.7 MG/DL (ref 8.4–10.2)
CARDIAC TROPONIN I PNL SERPL HS: 19 NG/L
CARDIAC TROPONIN I PNL SERPL HS: 22 NG/L
CARDIAC TROPONIN I PNL SERPL HS: 24 NG/L
CHLORIDE SERPL-SCNC: 106 MMOL/L (ref 96–108)
CHOLEST SERPL-MCNC: 207 MG/DL
CO2 SERPL-SCNC: 22 MMOL/L (ref 21–32)
CREAT SERPL-MCNC: 0.83 MG/DL (ref 0.6–1.3)
EOSINOPHIL # BLD AUTO: 0.02 THOUSAND/ÂΜL (ref 0–0.61)
EOSINOPHIL NFR BLD AUTO: 0 % (ref 0–6)
ERYTHROCYTE [DISTWIDTH] IN BLOOD BY AUTOMATED COUNT: 13.2 % (ref 11.6–15.1)
FLUAV RNA RESP QL NAA+PROBE: NEGATIVE
FLUBV RNA RESP QL NAA+PROBE: NEGATIVE
GFR SERPL CREATININE-BSD FRML MDRD: 89 ML/MIN/1.73SQ M
GLUCOSE SERPL-MCNC: 184 MG/DL (ref 65–140)
HCT VFR BLD AUTO: 46.7 % (ref 36.5–49.3)
HDLC SERPL-MCNC: 57 MG/DL
HGB BLD-MCNC: 16.4 G/DL (ref 12–17)
IMM GRANULOCYTES # BLD AUTO: 0.01 THOUSAND/UL (ref 0–0.2)
IMM GRANULOCYTES NFR BLD AUTO: 0 % (ref 0–2)
LDLC SERPL CALC-MCNC: 138 MG/DL (ref 0–100)
LYMPHOCYTES # BLD AUTO: 2.3 THOUSANDS/ÂΜL (ref 0.6–4.47)
LYMPHOCYTES NFR BLD AUTO: 29 % (ref 14–44)
MAGNESIUM SERPL-MCNC: 2.1 MG/DL (ref 1.9–2.7)
MCH RBC QN AUTO: 31.1 PG (ref 26.8–34.3)
MCHC RBC AUTO-ENTMCNC: 35.1 G/DL (ref 31.4–37.4)
MCV RBC AUTO: 89 FL (ref 82–98)
MONOCYTES # BLD AUTO: 0.4 THOUSAND/ÂΜL (ref 0.17–1.22)
MONOCYTES NFR BLD AUTO: 5 % (ref 4–12)
NEUTROPHILS # BLD AUTO: 5.11 THOUSANDS/ÂΜL (ref 1.85–7.62)
NEUTS SEG NFR BLD AUTO: 65 % (ref 43–75)
NRBC BLD AUTO-RTO: 0 /100 WBCS
PLATELET # BLD AUTO: 213 THOUSANDS/UL (ref 149–390)
PMV BLD AUTO: 10 FL (ref 8.9–12.7)
POTASSIUM SERPL-SCNC: 3.6 MMOL/L (ref 3.5–5.3)
PROT SERPL-MCNC: 6.7 G/DL (ref 6.4–8.4)
RBC # BLD AUTO: 5.27 MILLION/UL (ref 3.88–5.62)
RSV RNA RESP QL NAA+PROBE: NEGATIVE
SARS-COV-2 RNA RESP QL NAA+PROBE: NEGATIVE
SODIUM SERPL-SCNC: 137 MMOL/L (ref 135–147)
TRIGL SERPL-MCNC: 58 MG/DL
WBC # BLD AUTO: 7.88 THOUSAND/UL (ref 4.31–10.16)

## 2024-09-06 PROCEDURE — 80061 LIPID PANEL: CPT | Performed by: NURSE PRACTITIONER

## 2024-09-06 PROCEDURE — 71045 X-RAY EXAM CHEST 1 VIEW: CPT

## 2024-09-06 PROCEDURE — S9083 URGENT CARE CENTER GLOBAL: HCPCS | Performed by: NURSE PRACTITIONER

## 2024-09-06 PROCEDURE — 99285 EMERGENCY DEPT VISIT HI MDM: CPT

## 2024-09-06 PROCEDURE — 85025 COMPLETE CBC W/AUTO DIFF WBC: CPT

## 2024-09-06 PROCEDURE — 83735 ASSAY OF MAGNESIUM: CPT

## 2024-09-06 PROCEDURE — 83036 HEMOGLOBIN GLYCOSYLATED A1C: CPT | Performed by: NURSE PRACTITIONER

## 2024-09-06 PROCEDURE — 36415 COLL VENOUS BLD VENIPUNCTURE: CPT

## 2024-09-06 PROCEDURE — 99223 1ST HOSP IP/OBS HIGH 75: CPT | Performed by: NURSE PRACTITIONER

## 2024-09-06 PROCEDURE — 93005 ELECTROCARDIOGRAM TRACING: CPT

## 2024-09-06 PROCEDURE — 80053 COMPREHEN METABOLIC PANEL: CPT

## 2024-09-06 PROCEDURE — 0241U HB NFCT DS VIR RESP RNA 4 TRGT: CPT | Performed by: NURSE PRACTITIONER

## 2024-09-06 PROCEDURE — 84484 ASSAY OF TROPONIN QUANT: CPT

## 2024-09-06 PROCEDURE — 99214 OFFICE O/P EST MOD 30 MIN: CPT | Performed by: NURSE PRACTITIONER

## 2024-09-06 PROCEDURE — 93005 ELECTROCARDIOGRAM TRACING: CPT | Performed by: NURSE PRACTITIONER

## 2024-09-06 RX ORDER — ASPIRIN 325 MG
325 TABLET ORAL ONCE
Status: COMPLETED | OUTPATIENT
Start: 2024-09-06 | End: 2024-09-06

## 2024-09-06 RX ORDER — SODIUM CHLORIDE 9 MG/ML
3 INJECTION INTRAVENOUS
Status: DISCONTINUED | OUTPATIENT
Start: 2024-09-06 | End: 2024-09-06

## 2024-09-06 RX ORDER — ENOXAPARIN SODIUM 100 MG/ML
40 INJECTION SUBCUTANEOUS DAILY
Status: DISCONTINUED | OUTPATIENT
Start: 2024-09-07 | End: 2024-09-07 | Stop reason: HOSPADM

## 2024-09-06 RX ADMIN — ASPIRIN 325 MG ORAL TABLET 325 MG: 325 PILL ORAL at 20:59

## 2024-09-06 NOTE — Clinical Note
Case was discussed with COLTON and the patient's admission status was agreed to be Admission Status: observation status to the service of Dr. Capellan

## 2024-09-06 NOTE — ED PROVIDER NOTES
History  Chief Complaint   Patient presents with    Weakness - Generalized     Weakness, sob, dizziness, intermittent nausea. Almost syncopy. For a couple days.Seen at urgent care for this today.      69-year-old male presenting to the ED for complaint of lightheadedness, dizziness increasing in the past 2 days.  Patient states that the symptoms do come on primarily after exertion however he started noticed the symptoms come on just at rest.  Patient mentions he was driving today when he all of a sudden felt lightheaded and dizzy out of nowhere and had to pull over.  Patient had another instance today where he was bringing groceries into the house and said he had to grab the wall because he thought he was going to pass out.  Patient is denying chest pain, palpitations, shortness of breath, difficulty breathing.  Of note patient has had 2 significant traumatic events in the past 2 months that he has noted worsening symptoms following.        Prior to Admission Medications   Prescriptions Last Dose Informant Patient Reported? Taking?   sertraline (ZOLOFT) 25 mg tablet Not Taking  No No   Sig: Take 1 tablet (25 mg total) by mouth daily   Patient not taking: Reported on 6/6/2024      Facility-Administered Medications: None       Past Medical History:   Diagnosis Date    Osteoarthritis of knee 6/26/2015    Umbilical hernia 3/15/2020       Past Surgical History:   Procedure Laterality Date    KNEE ARTHROSCOPY Left     NASAL SINUS SURGERY      Nasal FX Repair    IN RPR UMBILICAL HRNA 5 YRS/> REDUCIBLE N/A 9/11/2020    Procedure: UMBILICAL HERNIA REPAIR;  Surgeon: Brandin Arizmendi MD;  Location: [facility];  Service: General       Family History   Problem Relation Age of Onset    Scleroderma Mother     No Known Problems Father      I have reviewed and agree with the history as documented.    E-Cigarette/Vaping    E-Cigarette Use Never User      E-Cigarette/Vaping Substances    Nicotine No     THC No     CBD No     Flavoring No      Other No     Unknown No      Social History     Tobacco Use    Smoking status: Never    Smokeless tobacco: Never   Vaping Use    Vaping status: Never Used   Substance Use Topics    Alcohol use: Yes     Comment: occasional alcohol use    Drug use: Not Currently     Types: Marijuana        Review of Systems   Constitutional:  Negative for chills and fever.   Respiratory:  Negative for chest tightness and shortness of breath.    Cardiovascular:  Negative for chest pain and palpitations.   Gastrointestinal:  Negative for abdominal pain, diarrhea, nausea and vomiting.   Genitourinary:  Negative for dysuria.   Musculoskeletal:  Negative for gait problem and myalgias.   Skin:  Negative for pallor.   Neurological:  Positive for dizziness and light-headedness.   Psychiatric/Behavioral:  Negative for agitation and confusion.        Physical Exam  ED Triage Vitals [09/06/24 1830]   Temperature Pulse Respirations Blood Pressure SpO2   98.7 °F (37.1 °C) 93 15 135/90 94 %      Temp Source Heart Rate Source Patient Position - Orthostatic VS BP Location FiO2 (%)   Oral Monitor Sitting Right arm --      Pain Score       No Pain             Orthostatic Vital Signs  Vitals:    09/06/24 2106 09/06/24 2203 09/06/24 2204 09/06/24 2205   BP: 144/92 111/81 133/94 123/82   Pulse: 97 80 80 92   Patient Position - Orthostatic VS:           Physical Exam  Constitutional:       Appearance: Normal appearance.   HENT:      Head: Normocephalic and atraumatic.      Right Ear: External ear normal.      Left Ear: External ear normal.      Nose: Nose normal.      Mouth/Throat:      Pharynx: Oropharynx is clear.   Eyes:      Extraocular Movements: Extraocular movements intact.      Conjunctiva/sclera: Conjunctivae normal.      Pupils: Pupils are equal, round, and reactive to light.   Cardiovascular:      Rate and Rhythm: Normal rate and regular rhythm.      Pulses: Normal pulses.      Heart sounds: Normal heart sounds.   Pulmonary:      Effort:  Pulmonary effort is normal.      Breath sounds: Normal breath sounds.   Abdominal:      General: Abdomen is flat. Bowel sounds are normal.      Palpations: Abdomen is soft.   Musculoskeletal:         General: Normal range of motion.      Cervical back: Normal range of motion.   Skin:     General: Skin is warm.      Capillary Refill: Capillary refill takes less than 2 seconds.   Neurological:      General: No focal deficit present.      Mental Status: He is alert and oriented to person, place, and time.   Psychiatric:         Mood and Affect: Mood normal.         Behavior: Behavior normal.         ED Medications  Medications   enoxaparin (LOVENOX) subcutaneous injection 40 mg (has no administration in time range)   aspirin tablet 325 mg (325 mg Oral Given 9/6/24 2059)       Diagnostic Studies  Results Reviewed       Procedure Component Value Units Date/Time    Lipid Panel with Direct LDL reflex [[id number]]  (Abnormal) Collected: 09/06/24 1854    Lab Status: Final result Specimen: Blood from Arm, Left Updated: 09/06/24 2342     Cholesterol 207 mg/dL      Triglycerides 58 mg/dL      HDL, Direct 57 mg/dL      LDL Calculated 138 mg/dL     HS Troponin I 4hr [[id number]]  (Normal) Collected: 09/06/24 2310    Lab Status: Final result Specimen: Blood from Hand, Left Updated: 09/06/24 2342     hs TnI 4hr 24 ng/L      Delta 4hr hsTnI 5 ng/L     COVID/FLU/RSV [[id number]]  (Normal) Collected: 09/06/24 2122    Lab Status: Final result Specimen: Nares from Nose Updated: 09/06/24 2216     SARS-CoV-2 Negative     INFLUENZA A PCR Negative     INFLUENZA B PCR Negative     RSV PCR Negative    Narrative:      This test has been performed using the CoV-2/Flu/RSV plus assay on the [employer] GeneXpert platform. This test has been validated by the  and verified by the performing laboratory.     This test is designed to amplify and detect the following: nucleocapsid (N), envelope (E), and RNA-dependent RNA polymerase (RdRP) genes of  the SARS-CoV-2 genome; matrix (M), basic polymerase (PB2), and acidic protein (PA) segments of the influenza A genome; matrix (M) and non-structural protein (NS) segments of the influenza B genome, and the nucleocapsid genes of RSV A and RSV B.     Positive results are indicative of the presence of Flu A, Flu B, RSV, and/or SARS-CoV-2 RNA. Positive results for SARS-CoV-2 or suspected novel influenza should be reported to state, local, or federal health departments according to local reporting requirements.      All results should be assessed in conjunction with clinical presentation and other laboratory markers for clinical management.     FOR PEDIATRIC PATIENTS - copy/paste COVID Guidelines URL to browser: [email or web address]       HS Troponin I 2hr [[id number]]  (Normal) Collected: 09/06/24 2132    Lab Status: Final result Specimen: Blood from Hand, Right Updated: 09/06/24 2200     hs TnI 2hr 22 ng/L      Delta 2hr hsTnI 3 ng/L     HS Troponin 0hr (reflex protocol) [[id number]]  (Normal) Collected: 09/06/24 1854    Lab Status: Final result Specimen: Blood from Arm, Left Updated: 09/06/24 1922     hs TnI 0hr 19 ng/L     Comprehensive metabolic panel [[id number]]  (Abnormal) Collected: 09/06/24 1854    Lab Status: Final result Specimen: Blood from Arm, Left Updated: 09/06/24 1918     Sodium 137 mmol/L      Potassium 3.6 mmol/L      Chloride 106 mmol/L      CO2 22 mmol/L      ANION GAP 9 mmol/L      BUN 18 mg/dL      Creatinine 0.83 mg/dL      Glucose 184 mg/dL      Calcium 8.7 mg/dL      AST 15 U/L      ALT 13 U/L      Alkaline Phosphatase 57 U/L      Total Protein 6.7 g/dL      Albumin 4.0 g/dL      Total Bilirubin 0.53 mg/dL      eGFR 89 ml/min/1.73sq m     Narrative:      National Kidney Disease Foundation guidelines for Chronic Kidney Disease (CKD):     Stage 1 with normal or high GFR (GFR > 90 mL/min/1.73 square meters)    Stage 2 Mild CKD (GFR = 60-89  mL/min/1.73 square meters)    Stage 3A Moderate CKD (GFR = 45-59 mL/min/1.73 square meters)    Stage 3B Moderate CKD (GFR = 30-44 mL/min/1.73 square meters)    Stage 4 Severe CKD (GFR = 15-29 mL/min/1.73 square meters)    Stage 5 End Stage CKD (GFR <15 mL/min/1.73 square meters)  Note: GFR calculation is accurate only with a steady state creatinine    Magnesium [[id number]]  (Normal) Collected: 09/06/24 1854    Lab Status: Final result Specimen: Blood from Arm, Left Updated: 09/06/24 1918     Magnesium 2.1 mg/dL     CBC and differential [[id number]] Collected: 09/06/24 1854    Lab Status: Final result Specimen: Blood from Arm, Left Updated: 09/06/24 1907     WBC 7.88 Thousand/uL      RBC 5.27 Million/uL      Hemoglobin 16.4 g/dL      Hematocrit 46.7 %      MCV 89 fL      MCH 31.1 pg      MCHC 35.1 g/dL      RDW 13.2 %      MPV 10.0 fL      Platelets 213 Thousands/uL      nRBC 0 /100 WBCs      Segmented % 65 %      Immature Grans % 0 %      Lymphocytes % 29 %      Monocytes % 5 %      Eosinophils Relative 0 %      Basophils Relative 1 %      Absolute Neutrophils 5.11 Thousands/µL      Absolute Immature Grans 0.01 Thousand/uL      Absolute Lymphocytes 2.30 Thousands/µL      Absolute Monocytes 0.40 Thousand/µL      Eosinophils Absolute 0.02 Thousand/µL      Basophils Absolute 0.04 Thousands/µL                    X-ray chest 1 view portable   ED Interpretation by Jg Graves MD (09/07 0040)   Mild patchiness in the right lower lung field.  Similar to previous x-ray.            Procedures  ECG 12 Lead Documentation Only    Date/Time: 9/6/2024 6:45 PM    Performed by: Jg Graves MD  Authorized by: Jg Graves MD    Indications / Diagnosis:  Dizziness  Patient location:  ED  Previous ECG:     Previous ECG:  Compared to current    Similarity:  Changes noted  Interpretation:     Interpretation: normal    Rate:     ECG rate:  93  Rhythm:     Rhythm: sinus rhythm    Ectopy:     Ectopy: none    QRS:     QRS  axis:  Normal    QRS intervals:  Wide  Conduction:     Conduction: abnormal      Abnormal conduction: incomplete RBBB    ST segments:     ST segments:  Normal  T waves:     T waves: normal          ED Course  ED Course as of 09/07/24 0041   Fri Sep 06, 2024   1952 hs TnI 0hr: 19  Will delta.   1952 MAGNESIUM: 2.1  Unremarkable.   1952 Comprehensive metabolic panel(!)  Elevated glucose otherwise unremarkable and reassuring CMP.   1952 Unremarkable and reassuring CBC.   1953 CBC and differential  Unremarkable and reassuring CBC.             HEART Risk Score      Flowsheet Row Most Recent Value   Heart Score Risk Calculator    History 1 Filed at: 09/07/2024 0040   ECG 1 Filed at: 09/07/2024 0040   Age 2 Filed at: 09/07/2024 0040   Risk Factors 1 Filed at: 09/07/2024 0040   Troponin 1 Filed at: 09/07/2024 0040   HEART Score 6 Filed at: 09/07/2024 0040                                  Medical Decision Making  69-year-old male presenting to ED with dizziness and lightheadedness.    Differential includes ACS, arrhythmia, MI, electrolyte abnormality, metabolic disturbance.    Ordered CBC, CMP, troponin, magnesium, troponin, EKG, chest x-ray.    See ED course for interpretation results and continued management.    Given patient's symptoms and story as well as elevated troponin, discussed with patient about being admitted.  Patient agreed and patient admitted to  IM.    Amount and/or Complexity of Data Reviewed  Labs: ordered. Decision-making details documented in ED Course.  Radiology: ordered.    Risk  OTC drugs.  Prescription drug management.  Decision regarding hospitalization.          Disposition  Final diagnoses:   Weakness   Dizziness   Elevated troponin     Time reflects when diagnosis was documented in both MDM as applicable and the Disposition within this note       Time User Action Codes Description Comment    9/6/2024  9:17 PM Jg Pendleton [R53.1] Weakness     9/6/2024  9:17 PM Jg Pendleton  [R42] Dizziness     9/6/2024  9:17 PM Jg Graves Add [R79.89] Elevated troponin           ED Disposition       ED Disposition   Admit    Condition   Stable    Date/Time   Fri Sep 6, 2024 2117    Comment   Case was discussed with COLTON and the patient's admission status was agreed to be Admission Status: observation status to the service of Dr. Wilson .               Follow-up Information    None         Current Discharge Medication List        CONTINUE these medications which have NOT CHANGED    Details   sertraline (ZOLOFT) 25 mg tablet Take 1 tablet (25 mg total) by mouth daily  Qty: 30 tablet, Refills: 1    Associated Diagnoses: Depression, unspecified depression type; Anxiety           No discharge procedures on file.    PDMP Review       None             ED Provider  Attending physically available and evaluated Isac Hung. I managed the patient along with the ED Attending.    Electronically Signed by           Jg Graves MD  09/07/24 0041

## 2024-09-06 NOTE — PROGRESS NOTES
"  [facility]        NAME: Isac Hung is a 69 y.o. male  : 1955    MRN: [id number]  DATE: 2024  TIME: 5:52 PM    Assessment and Plan   Lightheadedness [R42]  1. Lightheadedness  POCT ECG    Transfer to other facility      2. Dyspnea, unspecified type        3. Nausea        4. Weakness          --911 contacted.  Ambulance arrived within 10 minutes and will bring patient to ER.  Stable, no acute distress at time of discharge from urgent care.      Patient Instructions     Patient Instructions   --Please proceed directly to ER via ambulance     If tests have been performed at [facility], our office will contact you with results if changes need to be made to the care plan discussed with you at the visit.  [facility]    Chief Complaint     Chief Complaint   Patient presents with    Dizziness         History of Present Illness       Here with son-in-law for complaints of lightheadedness, dyspnea, nausea, sweats, generalized weakness since yesterday.    Got better initially, then returned today.    Denies chest pain/heaviness, abdominal pain, calf pain.  Some palpitations at times.   Admits to increased stress in his life over the past year in particular, but denies acute anxiety. States his current symptoms \"do not feel like a panic attack\"  Denies prior personal/family cardiac history.    Last EKG 2020 normal with incomplete RBBB   Last labs 2020.         Review of Systems   Review of Systems   Constitutional:  Positive for fatigue.   Respiratory:  Positive for shortness of breath.    Cardiovascular:  Positive for palpitations. Negative for chest pain.   Gastrointestinal:  Positive for nausea. Negative for abdominal pain and vomiting.   Musculoskeletal:  Negative for back pain.   Neurological:  Positive for weakness and light-headedness. Negative for syncope and headaches.         Current Medications       Current Outpatient Medications:     " sertraline (ZOLOFT) 25 mg tablet, Take 1 tablet (25 mg total) by mouth daily (Patient not taking: Reported on 6/6/2024), Disp: 30 tablet, Rfl: 1    Current Allergies     Allergies as of 09/06/2024    (No Known Allergies)            The following portions of the patient's history were reviewed and updated as appropriate: allergies, current medications, past family history, past medical history, past social history, past surgical history and problem list.     Past Medical History:   Diagnosis Date    Osteoarthritis of knee 6/26/2015    Umbilical hernia 3/15/2020       Past Surgical History:   Procedure Laterality Date    KNEE ARTHROSCOPY Left     NASAL SINUS SURGERY      Nasal FX Repair    [place] RPR UMBILICAL HRNA 5 YRS/> REDUCIBLE N/A 9/11/2020    Procedure: UMBILICAL HERNIA REPAIR;  Surgeon: Brandin Arizmendi MD;  Location: [facility];  Service: General       Family History   Problem Relation Age of Onset    Scleroderma Mother     No Known Problems Father          Medications have been verified.        Objective   /85   Pulse (!) 127   Temp 98 °F (36.7 °C)   Resp 14   Ht 6' (1.829 m)   Wt 116 kg (255 lb)   SpO2 98%   BMI 34.58 kg/m²   No LMP for male patient.       Physical Exam     Physical Exam  Constitutional:       General: He is not in acute distress.     Appearance: He is not ill-appearing, toxic-appearing or diaphoretic.   Cardiovascular:      Rate and Rhythm: Regular rhythm. Tachycardia present.      Comments: Mild tachycardia.  Rate =120.   Pulmonary:      Effort: Pulmonary effort is normal. No respiratory distress.      Breath sounds: Normal breath sounds. No stridor. No wheezing, rhonchi or rales.      Comments: Breathing non-labored.    Chest:      Chest wall: No tenderness.   Abdominal:      General: Abdomen is flat.      Tenderness: There is no abdominal tenderness.   Musculoskeletal:         General: No tenderness.   Neurological:      Mental Status: He is alert and oriented to person, place, and  time.

## 2024-09-07 VITALS
HEART RATE: 91 BPM | HEIGHT: 72 IN | SYSTOLIC BLOOD PRESSURE: 165 MMHG | TEMPERATURE: 97.7 F | RESPIRATION RATE: 16 BRPM | WEIGHT: 242.51 LBS | DIASTOLIC BLOOD PRESSURE: 129 MMHG | OXYGEN SATURATION: 100 % | BODY MASS INDEX: 32.85 KG/M2

## 2024-09-07 LAB
ANION GAP SERPL CALCULATED.3IONS-SCNC: 8 MMOL/L (ref 4–13)
ATRIAL RATE: 70 BPM
ATRIAL RATE: 74 BPM
BASOPHILS # BLD AUTO: 0.07 THOUSANDS/ÂΜL (ref 0–0.1)
BASOPHILS NFR BLD AUTO: 1 % (ref 0–1)
BUN SERPL-MCNC: 16 MG/DL (ref 5–25)
CALCIUM SERPL-MCNC: 8.5 MG/DL (ref 8.4–10.2)
CHLORIDE SERPL-SCNC: 106 MMOL/L (ref 96–108)
CO2 SERPL-SCNC: 23 MMOL/L (ref 21–32)
CREAT SERPL-MCNC: 0.75 MG/DL (ref 0.6–1.3)
EOSINOPHIL # BLD AUTO: 0.08 THOUSAND/ÂΜL (ref 0–0.61)
EOSINOPHIL NFR BLD AUTO: 1 % (ref 0–6)
ERYTHROCYTE [DISTWIDTH] IN BLOOD BY AUTOMATED COUNT: 13.3 % (ref 11.6–15.1)
EST. AVERAGE GLUCOSE BLD GHB EST-MCNC: 111 MG/DL
GFR SERPL CREATININE-BSD FRML MDRD: 92 ML/MIN/1.73SQ M
GLUCOSE P FAST SERPL-MCNC: 99 MG/DL (ref 65–99)
GLUCOSE SERPL-MCNC: 99 MG/DL (ref 65–140)
HBA1C MFR BLD: 5.5 %
HCT VFR BLD AUTO: 45.3 % (ref 36.5–49.3)
HGB BLD-MCNC: 15.7 G/DL (ref 12–17)
IMM GRANULOCYTES # BLD AUTO: 0.02 THOUSAND/UL (ref 0–0.2)
IMM GRANULOCYTES NFR BLD AUTO: 0 % (ref 0–2)
LYMPHOCYTES # BLD AUTO: 2.6 THOUSANDS/ÂΜL (ref 0.6–4.47)
LYMPHOCYTES NFR BLD AUTO: 36 % (ref 14–44)
MCH RBC QN AUTO: 31.1 PG (ref 26.8–34.3)
MCHC RBC AUTO-ENTMCNC: 34.7 G/DL (ref 31.4–37.4)
MCV RBC AUTO: 90 FL (ref 82–98)
MONOCYTES # BLD AUTO: 0.49 THOUSAND/ÂΜL (ref 0.17–1.22)
MONOCYTES NFR BLD AUTO: 7 % (ref 4–12)
NEUTROPHILS # BLD AUTO: 4.01 THOUSANDS/ÂΜL (ref 1.85–7.62)
NEUTS SEG NFR BLD AUTO: 55 % (ref 43–75)
NRBC BLD AUTO-RTO: 0 /100 WBCS
P AXIS: 33 DEGREES
P AXIS: 35 DEGREES
PLATELET # BLD AUTO: 181 THOUSANDS/UL (ref 149–390)
PMV BLD AUTO: 9.7 FL (ref 8.9–12.7)
POTASSIUM SERPL-SCNC: 3.7 MMOL/L (ref 3.5–5.3)
PR INTERVAL: 168 MS
PR INTERVAL: 174 MS
QRS AXIS: 66 DEGREES
QRS AXIS: 70 DEGREES
QRSD INTERVAL: 112 MS
QRSD INTERVAL: 112 MS
QT INTERVAL: 408 MS
QT INTERVAL: 408 MS
QTC INTERVAL: 440 MS
QTC INTERVAL: 452 MS
RBC # BLD AUTO: 5.05 MILLION/UL (ref 3.88–5.62)
SODIUM SERPL-SCNC: 137 MMOL/L (ref 135–147)
T WAVE AXIS: 15 DEGREES
T WAVE AXIS: 23 DEGREES
VENTRICULAR RATE: 70 BPM
VENTRICULAR RATE: 74 BPM
WBC # BLD AUTO: 7.27 THOUSAND/UL (ref 4.31–10.16)

## 2024-09-07 PROCEDURE — 85025 COMPLETE CBC W/AUTO DIFF WBC: CPT | Performed by: NURSE PRACTITIONER

## 2024-09-07 PROCEDURE — 93010 ELECTROCARDIOGRAM REPORT: CPT | Performed by: INTERNAL MEDICINE

## 2024-09-07 PROCEDURE — 93005 ELECTROCARDIOGRAM TRACING: CPT

## 2024-09-07 PROCEDURE — 80048 BASIC METABOLIC PNL TOTAL CA: CPT | Performed by: NURSE PRACTITIONER

## 2024-09-07 NOTE — H&P
Formerly [facility], later [facility]  H&P  Name: Isac Hung 69 y.o. male I MRN: [id number]  Unit/Bed#: S -01 I Date of Admission: 9/6/2024   Date of Service: 9/6/2024 I Hospital Day: 0      Assessment & Plan   * Lightheadedness  Assessment & Plan  Presentation: Patient presents with complaints of worsening lightheadedness and dizziness over the past 2 days. He describes the sensation as spinning when it occurs. Patient reports these symptoms primarily occur after exertion; however, he reports some episodes began occurring while at rest. Patient reports while driving today he felt lightheaded and dizzy to the point where he almost needed to pull over. Patient had another episode when he was bringing in groceries to his home and he had to grab the counter because he thought he was going to pass out.   Likely etiology: ACS versus cardiac arrhythmia versus orthostatic hypotension versus BPPV  Initial Troponin: 19 --> 22  Trend x3 or to peak.  Initial EKG: Sinus tachycardia, incomplete RBBB, inverted T waves, heart rate 108  Monitor on telemetry.  Received ASA in ED.  Check fasting lipid panel and A1c.    Obtain orthostatic blood pressure readings.  Neuro checks q4h.  If symptoms worsening consider CTH. No focal neuro deficits at this time.    Prediabetes  Assessment & Plan  Hx of prediabetes.  Obtain A1C.  POA, serum glucose of 184    Obesity (BMI 30.0-34.9)  Assessment & Plan  Encouraged lifestyle modifications.    Depression  Assessment & Plan  Patient is not currently on any pharmacological treatment for this.  He reports that he lost his wife in April 2024 and also his son passed away from suicide about 1.5 years ago. He currently is actively participating in grief counseling.         VTE Pharmacologic Prophylaxis: VTE Score: 3 Moderate Risk (Score 3-4) - Pharmacological DVT Prophylaxis Ordered: enoxaparin (Lovenox).  Code Status: Level 1 - Full Code per patient  Discussion with family: Patient declined  call to .     Anticipated Length of Stay: Patient will be admitted on an observation basis with an anticipated length of stay of less than 2 midnights secondary to ACS rule out.    Total Time Spent on Date of Encounter in care of patient: 70 mins. This time was spent on one or more of the following: performing physical exam; counseling and coordination of care; obtaining or reviewing history; documenting in the medical record; reviewing/ordering tests, medications or procedures; communicating with other healthcare professionals and discussing with patient's family/caregivers.    Chief Complaint: Dizziness, lightheadedness    History of Present Illness:  Isac Hung is a 69 y.o. male with a PMH of depression, prediabetes and obesity who presents with complaints of worsening lightheadedness and dizziness over the past 2 days. He describes the sensation as spinning when it occurs. Patient reports these symptoms primarily occur after exertion; however, he reports some episodes began occurring while at rest. Patient reports while driving today he felt lightheaded and dizzy to the point where he almost needed to pull over. Patient had another episode when he was bringing in groceries to his home and he had to grab the counter because he thought he was going to pass out.     Review of Systems:  Review of Systems   Constitutional:  Positive for diaphoresis. Negative for chills and fever.   Respiratory:  Positive for chest tightness and shortness of breath.    Cardiovascular:  Negative for chest pain.   Gastrointestinal:  Negative for nausea and vomiting.   Neurological:  Positive for dizziness, weakness (generalized) and light-headedness.   All other systems reviewed and are negative.      Past Medical and Surgical History:   Past Medical History:   Diagnosis Date    Osteoarthritis of knee 6/26/2015    Umbilical hernia 3/15/2020       Past Surgical History:   Procedure Laterality Date    KNEE ARTHROSCOPY  Left     NASAL SINUS SURGERY      Nasal FX Repair    GA RPR UMBILICAL HRNA 5 YRS/> REDUCIBLE N/A 9/11/2020    Procedure: UMBILICAL HERNIA REPAIR;  Surgeon: Brandin Arizmendi MD;  Location: [facility];  Service: General       Meds/Allergies:  Prior to Admission medications    Medication Sig Start Date End Date Taking? Authorizing Provider   sertraline (ZOLOFT) 25 mg tablet Take 1 tablet (25 mg total) by mouth daily  Patient not taking: Reported on 6/6/2024 8/30/23   BLANCA Ricardo     I have reviewed home medications with patient personally.    Allergies: No Known Allergies    Social History:  Marital Status:    Occupation: Unknown  Patient Pre-hospital Living Situation: Home, With other family member: son  Patient Pre-hospital Level of Mobility: walks  Patient Pre-hospital Diet Restrictions: None  Substance Use History:   Social History     Substance and Sexual Activity   Alcohol Use Yes    Comment: occasional alcohol use     Social History     Tobacco Use   Smoking Status Never   Smokeless Tobacco Never     Social History     Substance and Sexual Activity   Drug Use Not Currently    Types: Marijuana       Family History:  Family History   Problem Relation Age of Onset    Scleroderma Mother     No Known Problems Father        Physical Exam:     Vitals:   Blood Pressure: 123/82 (09/06/24 2205)  Pulse: 92 (09/06/24 2205)  Temperature: 98.7 °F (37.1 °C) (09/06/24 1830)  Temp Source: Oral (09/06/24 1830)  Respirations: 16 (09/06/24 2106)  Height: 6' (182.9 cm) (09/06/24 2205)  Weight - Scale: 110 kg (242 lb 8.1 oz) (09/06/24 2205)  SpO2: 99 % (09/06/24 2205)    Physical Exam  Vitals and nursing note reviewed.   Constitutional:       General: He is not in acute distress.     Appearance: He is not ill-appearing or diaphoretic.   HENT:      Head: Normocephalic.      Nose: Nose normal.      Mouth/Throat:      Pharynx: Oropharynx is clear.   Eyes:      General: No scleral icterus.     Conjunctiva/sclera:  Conjunctivae normal.   Cardiovascular:      Rate and Rhythm: Normal rate and regular rhythm.      Pulses: Normal pulses.      Heart sounds: Normal heart sounds. No murmur heard.  Pulmonary:      Effort: Pulmonary effort is normal. No respiratory distress.      Breath sounds: No wheezing, rhonchi or rales.   Chest:      Chest wall: No tenderness.   Abdominal:      General: Bowel sounds are normal. There is no distension.      Palpations: Abdomen is soft.      Tenderness: There is no abdominal tenderness.   Musculoskeletal:         General: No swelling or deformity. Normal range of motion.      Cervical back: Normal range of motion.   Skin:     General: Skin is warm and dry.      Capillary Refill: Capillary refill takes 2 to 3 seconds.   Neurological:      General: No focal deficit present.      Mental Status: He is alert and oriented to person, place, and time.          Additional Data:     Lab Results:  Results from last 7 days   Lab Units 09/06/24  1854   WBC Thousand/uL 7.88   HEMOGLOBIN g/dL 16.4   HEMATOCRIT % 46.7   PLATELETS Thousands/uL 213   SEGS PCT % 65   LYMPHO PCT % 29   MONO PCT % 5   EOS PCT % 0     Results from last 7 days   Lab Units 09/06/24  1854   SODIUM mmol/L 137   POTASSIUM mmol/L 3.6   CHLORIDE mmol/L 106   CO2 mmol/L 22   BUN mg/dL 18   CREATININE mg/dL 0.83   ANION GAP mmol/L 9   CALCIUM mg/dL 8.7   ALBUMIN g/dL 4.0   TOTAL BILIRUBIN mg/dL 0.53   ALK PHOS U/L 57   ALT U/L 13   AST U/L 15   GLUCOSE RANDOM mg/dL 184*             Lab Results   Component Value Date    HGBA1C 5.7 (H) 09/08/2020           Lines/Drains:  Invasive Devices       Peripheral Intravenous Line  Duration             Peripheral IV 09/06/24 Left Antecubital <1 day    Peripheral IV 09/06/24 Right;Ventral (anterior) Hand <1 day                        Imaging: Personally reviewed the following imaging: chest xray  X-ray chest 1 view portable    (Results Pending)       EKG and Other Studies Reviewed on Admission:   EKG: Sinus  Tachycardia. , incomplete RBBB, inverted T waves.    ** Please Note: This note has been constructed using a voice recognition system. **

## 2024-09-07 NOTE — UTILIZATION REVIEW
Initial Clinical Review    Admission: Date/Time/Statement:   Admission Orders (From admission, onward)       Ordered        09/06/24 2118  Place in Observation  Once                          Orders Placed This Encounter   Procedures    Place in Observation     Standing Status:   Standing     Number of Occurrences:   1     Order Specific Question:   Level of Care     Answer:   Med Surg [16]     ED Arrival Information       Expected   9/6/2024     Arrival   9/6/2024 18:15    Acuity   Urgent              Means of arrival   Ambulance    Escorted by   [facility]    Service   Hospitalist    Admission type   Emergency              Arrival complaint   Lightheadedness             Chief Complaint   Patient presents with    Weakness - Generalized     Weakness, sob, dizziness, intermittent nausea. Almost syncopy. For a couple days.Seen at urgent care for this today.        Initial Presentation:   69 yom to ER from home via EMS for lightheadedness, dizziness increasing in the past 2 days. Patient mentions he was driving today when he all of a sudden felt lightheaded and dizzy out of nowhere and had to pull over. Patient had another instance today where he was bringing groceries into the house and said he had to grab the wall because he thought he was going to pass out. Of note patient has had 2 significant traumatic events in the past 2 months that he has noted worsening symptoms following. Hx depression, prediabetes and obesity. Presents with s/s as above. Admission CXR: Low lung volumes with vascular crowding and bibasilar atelectasis. Superimposed edema or pneumonia not excluded in the appropriate clinical setting. EKG: Sinus tachycardia with incomplete RBBB, interior and anterolateral ischemic changes with no overt Q-waves or ST elevation noted.   Placed in observation status for lightheadedness. Placed on telemetry monitoring, orthostatics ordered.      ED Triage Vitals [09/06/24 1830]   Temperature Pulse Respirations  Blood Pressure SpO2 Pain Score   98.7 °F (37.1 °C) 93 15 135/90 94 % No Pain     Weight (last 2 days)       Date/Time Weight    09/06/24 22:05:22 110 (242.51)    09/06/24 1830 113 (250)            Vital Signs (last 3 days)       Date/Time Temp Pulse Resp BP MAP (mmHg) SpO2 O2 Device Patient Position - Orthostatic VS Pain    09/07/24 07:35:09 97.7 °F (36.5 °C) 65 -- 110/72 85 97 % -- -- --    09/07/24 06:00:37 -- 68 -- 111/70 84 95 % -- -- --    09/06/24 2214 -- -- -- -- -- -- -- -- No Pain    09/06/24 22:05:22 -- 92 -- 123/82 96 99 % -- -- --    09/06/24 22:04:27 -- 80 -- 133/94 107 95 % -- -- --    09/06/24 22:03:32 -- 80 -- 111/81 91 97 % -- -- --    09/06/24 2106 -- 97 16 144/92 105 94 % -- -- --    09/06/24 1900 -- 92 16 126/81 96 93 % -- -- --    09/06/24 1830 98.7 °F (37.1 °C) 93 15 135/90 102 94 % None (Room air) Sitting No Pain              Pertinent Labs/Diagnostic Test Results:   Radiology:  X-ray chest 1 view portable   ED Interpretation by Jg Graves MD (09/07 0040)   Mild patchiness in the right lower lung field.  Similar to previous x-ray.      Final Interpretation by Angelia Tolentino MD (09/07 0552)      Low lung volumes with vascular crowding and bibasilar atelectasis. Superimposed edema or pneumonia not excluded in the appropriate clinical setting.            Workstation performed: [id number]           Cardiology:  ECG 12 lead   Final Result by Ellis Syed MD (09/07 0740)   Normal sinus rhythm   Incomplete right bundle branch block   Borderline ECG   When compared with ECG of 07-SEP-2024 00:34, (unconfirmed)   Premature ventricular complexes are no longer Present   Confirmed by Ellis Syed ([id number]) on 9/7/2024 7:40:41 AM      ECG 12 lead   Final Result by Ellis Syed MD (09/07 0740)   Sinus rhythm with sinus arrhythmia with occasional Premature ventricular    complexes   Incomplete right bundle branch block   Borderline ECG   When compared with ECG of 06-SEP-2024 18:36,  "(unconfirmed)   Premature ventricular complexes are now Present   Confirmed by Ellis Syed ([id number]) on 9/7/2024 7:40:46 AM        GI:  No orders to display       Results from last 7 days   Lab Units 09/06/24 2122   SARS-COV-2  Negative     Results from last 7 days   Lab Units 09/07/24  0500 09/06/24  1854   WBC Thousand/uL 7.27 7.88   HEMOGLOBIN g/dL 15.7 16.4   HEMATOCRIT % 45.3 46.7   PLATELETS Thousands/uL 181 213   TOTAL NEUT ABS Thousands/µL 4.01 5.11         Results from last 7 days   Lab Units 09/07/24  0500 09/06/24  1854   SODIUM mmol/L 137 137   POTASSIUM mmol/L 3.7 3.6   CHLORIDE mmol/L 106 106   CO2 mmol/L 23 22   ANION GAP mmol/L 8 9   BUN mg/dL 16 18   CREATININE mg/dL 0.75 0.83   EGFR ml/min/1.73sq m 92 89   CALCIUM mg/dL 8.5 8.7   MAGNESIUM mg/dL  --  2.1     Results from last 7 days   Lab Units 09/06/24  1854   AST U/L 15   ALT U/L 13   ALK PHOS U/L 57   TOTAL PROTEIN g/dL 6.7   ALBUMIN g/dL 4.0   TOTAL BILIRUBIN mg/dL 0.53         Results from last 7 days   Lab Units 09/07/24  0500 09/06/24  1854   GLUCOSE RANDOM mg/dL 99 184*         Results from last 7 days   Lab Units 09/06/24  1854   HEMOGLOBIN A1C % 5.5   EAG mg/dl 111     No results found for: \"BETA-HYDROXYBUTYRATE\"                   Results from last 7 days   Lab Units 09/06/24  2310 09/06/24  2132 09/06/24  1854   HS TNI 0HR ng/L  --   --  19   HS TNI 2HR ng/L  --  22  --    HSTNI D2 ng/L  --  3  --    HS TNI 4HR ng/L 24  --   --    HSTNI D4 ng/L 5  --   --                                                                          Results from last 7 days   Lab Units 09/06/24 2122   INFLUENZA A PCR  Negative   INFLUENZA B PCR  Negative   RSV PCR  Negative                                               ED Treatment-Medication Administration from 09/06/2024 1750 to 09/06/2024 2206         Date/Time Order Dose Route Action     09/06/2024 2059 aspirin tablet 325 mg 325 mg Oral Given            Past Medical History:   Diagnosis Date    " Osteoarthritis of knee 6/26/2015    Umbilical hernia 3/15/2020     Present on Admission:   Depression   Prediabetes   Obesity (BMI 30.0-34.9)   Lightheadedness      Admitting Diagnosis: Dizziness [R42]  Weakness [R53.1]  Elevated troponin [R79.89]  Age/Sex: 69 y.o. male  Admission Orders:  Cont pulse ox  Telemetry  Orthostatic BP  Neuro checks q4h    Scheduled Medications:  enoxaparin, 40 mg, Subcutaneous, Daily    Network Utilization Review Department  ATTENTION: Please call with any questions or concerns to [phone] and carefully listen to the prompts so that you are directed to the right person. All voicemails are confidential.   For Discharge needs, contact Care Management DC Support Team at [phone] opt. 2  Send all requests for admission clinical reviews, approved or denied determinations and any other requests to dedicated fax number below belonging to the campus where the patient is receiving treatment. List of dedicated fax numbers for the Facilities:  FACILITY NAME UR FAX NUMBER   ADMISSION DENIALS (Administrative/Medical Necessity) [phone]   DISCHARGE SUPPORT TEAM (NETWORK) [phone]   PARENT CHILD HEALTH (Maternity/NICU/Pediatrics) [phone]   [facility] [phone]   [facility] [phone]   [facility] [phone]   [facility] [phone]   [facility] [phone]   [facility] [phone]   [facility] [phone]   [facility] [phone]   [facility] [phone]   [facility] [phone]   [facility] [phone]   [facility] [phone]

## 2024-09-07 NOTE — DISCHARGE INSTR - AVS FIRST PAGE
Dear Isac Hung,     It was our pleasure to care for you here at [facility].  It is our hope that we were always able to exceed the expected standards for your care during your stay.  You were hospitalized due to dizziness.  You were cared for on the 3rd floor under the service of Brandin Salcedo MD with the [facility] who covers for your primary care physician (PCP), BLANCA Ricardo, while you were hospitalized.  If you have any questions or concerns related to this hospitalization, you may contact us at .  For follow up as well as medication refills, we recommend that you follow up with your primary care physician.  A registered nurse will reach out to you by phone within a few days after your discharge to answer any additional questions that you may have after going home.  However, at this time we provide for you here, the most important instructions / recommendations at discharge:     Notable Medication Adjustments -   No changes to medications  Testing Required after Discharge -   An echocardiogram was ordered as an outpatient   ** Please contact your PCP to request testing orders for any of the testing recommended here **  Important follow up information -   Please see PCP in follow-up  Other Instructions -   None   Please review this entire after visit summary as additional general instructions including medication list, appointments, activity, diet, any pertinent wound care, and other additional recommendations from your care team that may be provided for you.      Sincerely,     Brandin Salcedo MD

## 2024-09-07 NOTE — ASSESSMENT & PLAN NOTE
Patient is not currently on any pharmacological treatment for this.  He reports that he lost his wife in April 2024 and also his son passed away from suicide about 1.5 years ago. He currently is actively participating in grief counseling.

## 2024-09-07 NOTE — PLAN OF CARE
Problem: NEUROSENSORY - ADULT  Goal: Achieves stable or improved neurological status  Description: INTERVENTIONS  - Monitor and report changes in neurological status  - Monitor vital signs such as temperature, blood pressure, glucose, and any other labs ordered   - Initiate measures to prevent increased intracranial pressure  - Monitor for seizure activity and implement precautions if appropriate      Outcome: Progressing  Goal: Achieves maximal functionality and self care  Description: INTERVENTIONS  - Monitor swallowing and airway patency with patient fatigue and changes in neurological status  - Encourage and assist patient to increase activity and self care.   - Encourage visually impaired, hearing impaired and aphasic patients to use assistive/communication devices  Outcome: Progressing     Problem: CARDIOVASCULAR - ADULT  Goal: Maintains optimal cardiac output and hemodynamic stability  Description: INTERVENTIONS:  - Monitor I/O, vital signs and rhythm  - Monitor for S/S and trends of decreased cardiac output  - Administer and titrate ordered vasoactive medications to optimize hemodynamic stability  - Assess quality of pulses, skin color and temperature  - Assess for signs of decreased coronary artery perfusion  - Instruct patient to report change in severity of symptoms  Outcome: Progressing  Goal: Absence of cardiac dysrhythmias or at baseline rhythm  Description: INTERVENTIONS:  - Continuous cardiac monitoring, vital signs, obtain 12 lead EKG if ordered  - Administer antiarrhythmic and heart rate control medications as ordered  - Monitor electrolytes and administer replacement therapy as ordered  Outcome: Progressing

## 2024-09-07 NOTE — ASSESSMENT & PLAN NOTE
Presentation: Patient presents with complaints of worsening lightheadedness and dizziness over the past 2 days. He describes the sensation as spinning when it occurs. Patient reports these symptoms primarily occur after exertion; however, he reports some episodes began occurring while at rest. Patient reports while driving today he felt lightheaded and dizzy to the point where he almost needed to pull over. Patient had another episode when he was bringing in groceries to his home and he had to grab the counter because he thought he was going to pass out.   Likely etiology: ACS versus cardiac arrhythmia versus orthostatic hypotension versus BPPV  Initial Troponin: 19 --> 22  Trend x3 or to peak.  Initial EKG: Sinus tachycardia, incomplete RBBB, inverted T waves, heart rate 108  Monitor on telemetry.  Received ASA in ED.  Check fasting lipid panel and A1c.    Obtain orthostatic blood pressure readings.  Neuro checks q4h.  If symptoms worsening consider CTH. No focal neuro deficits at this time.

## 2024-09-09 LAB
ATRIAL RATE: 108 BPM
P AXIS: 66 DEGREES
PR INTERVAL: 164 MS
QRS AXIS: 83 DEGREES
QRSD INTERVAL: 106 MS
QT INTERVAL: 338 MS
QTC INTERVAL: 452 MS
T WAVE AXIS: -9 DEGREES
VENTRICULAR RATE: 108 BPM

## 2024-09-09 PROCEDURE — 93010 ELECTROCARDIOGRAM REPORT: CPT | Performed by: INTERNAL MEDICINE

## 2024-09-10 LAB
ATRIAL RATE: 93 BPM
P AXIS: 55 DEGREES
PR INTERVAL: 166 MS
QRS AXIS: 86 DEGREES
QRSD INTERVAL: 112 MS
QT INTERVAL: 366 MS
QTC INTERVAL: 455 MS
T WAVE AXIS: 43 DEGREES
VENTRICULAR RATE: 93 BPM

## 2024-09-10 PROCEDURE — 93010 ELECTROCARDIOGRAM REPORT: CPT | Performed by: INTERNAL MEDICINE

## 2024-09-19 ENCOUNTER — HOSPITAL ENCOUNTER (OUTPATIENT)
Dept: NON INVASIVE DIAGNOSTICS | Facility: HOSPITAL | Age: 69
Discharge: HOME | End: 2024-09-19
Payer: COMMERCIAL

## 2024-09-19 VITALS
BODY MASS INDEX: 32.78 KG/M2 | SYSTOLIC BLOOD PRESSURE: 138 MMHG | HEIGHT: 72 IN | DIASTOLIC BLOOD PRESSURE: 86 MMHG | WEIGHT: 242 LBS | HEART RATE: 84 BPM

## 2024-09-19 DIAGNOSIS — R42 DIZZINESS: ICD-10-CM

## 2024-09-19 DIAGNOSIS — R42 LIGHTHEADEDNESS: ICD-10-CM

## 2024-09-19 LAB
AORTIC ROOT: 3.5 CM
APICAL FOUR CHAMBER EJECTION FRACTION: 31 %
BSA FOR ECHO PROCEDURE: 2.31 M2
E WAVE DECELERATION TIME: 206 MS
E/A RATIO: 0.76
FRACTIONAL SHORTENING: 24 (ref 28–44)
INTERVENTRICULAR SEPTUM IN DIASTOLE (PARASTERNAL SHORT AXIS VIEW): 1.3 CM
INTERVENTRICULAR SEPTUM: 1.3 CM (ref 0.6–1.1)
LAAS-AP2: 20.3 CM2
LAAS-AP4: 18.2 CM2
LEFT ATRIUM SIZE: 4 CM
LEFT ATRIUM VOLUME (MOD BIPLANE): 53 ML
LEFT ATRIUM VOLUME INDEX (MOD BIPLANE): 22.9 ML/M2
LEFT INTERNAL DIMENSION IN SYSTOLE: 3.7 CM (ref 2.1–4)
LEFT VENTRICLE DIASTOLIC VOLUME (MOD BIPLANE): 74 ML
LEFT VENTRICLE DIASTOLIC VOLUME INDEX (MOD BIPLANE): 32 ML/M2
LEFT VENTRICLE SYSTOLIC VOLUME (MOD BIPLANE): 53 ML
LEFT VENTRICLE SYSTOLIC VOLUME INDEX (MOD BIPLANE): 22.9 ML/M2
LEFT VENTRICULAR INTERNAL DIMENSION IN DIASTOLE: 4.9 CM (ref 3.5–6)
LEFT VENTRICULAR POSTERIOR WALL IN END DIASTOLE: 1.3 CM
LEFT VENTRICULAR STROKE VOLUME: 57 ML
LV EF: 28 %
LVSV (TEICH): 57 ML
MV E'TISSUE VEL-LAT: 5 CM/S
MV E'TISSUE VEL-SEP: 6 CM/S
MV PEAK A VEL: 0.63 M/S
MV PEAK E VEL: 48 CM/S
MV STENOSIS PRESSURE HALF TIME: 60 MS
MV VALVE AREA P 1/2 METHOD: 3.67
RIGHT VENTRICLE ID DIMENSION: 3.8 CM
SL CV LEFT ATRIUM LENGTH A2C: 5.5 CM
SL CV LV EF: 50
SL CV PED ECHO LEFT VENTRICLE DIASTOLIC VOLUME (MOD BIPLANE) 2D: 114 ML
SL CV PED ECHO LEFT VENTRICLE SYSTOLIC VOLUME (MOD BIPLANE) 2D: 56 ML
TR MAX PG: 23 MMHG
TR PEAK VELOCITY: 2.4 M/S
TRICUSPID ANNULAR PLANE SYSTOLIC EXCURSION: 2.1 CM
TRICUSPID VALVE PEAK REGURGITATION VELOCITY: 2.4 M/S

## 2024-09-19 PROCEDURE — 93306 TTE W/DOPPLER COMPLETE: CPT | Performed by: INTERNAL MEDICINE

## 2024-09-19 PROCEDURE — 93306 TTE W/DOPPLER COMPLETE: CPT

## 2024-09-23 ENCOUNTER — TELEPHONE (OUTPATIENT)
Age: 69
End: 2024-09-23

## 2024-09-23 NOTE — TELEPHONE ENCOUNTER
"Spoke with patient, relayed Dr Clayton's message regarding ECHO results:    \"Can you let him know there is nothing concerning on the echo, and that we can discuss it further at his appointment?     Thanks,   Gwen \"    Reviewed ECHO results, explained EF, regurgitation terms.    Verbalized understanding.  "

## 2024-09-23 NOTE — TELEPHONE ENCOUNTER
Caller: Isac Hung    Doctor: Forrest    Reason for call: Patient had an echo on 9/19/2024.  He is calling for the results.  Please call him.    Thank you    Call back#: [phone]

## 2024-09-23 NOTE — ED ATTENDING ATTESTATION
9/6/2024  I, Juno Leigh MD, saw and evaluated the patient. I have discussed the patient with the resident/non-physician practitioner and agree with the resident's/non-physician practitioner's findings, Plan of Care, and MDM as documented in the resident's/non-physician practitioner's note, except where noted. All available labs and Radiology studies were reviewed.  I was present for key portions of any procedure(s) performed by the resident/non-physician practitioner and I was immediately available to provide assistance.       At this point I agree with the current assessment done in the Emergency Department.  I have conducted an independent evaluation of this patient a history and physical is as follows:    69-year-old male presented for evaluation of generalized weakness/lightheadedness/near syncope with exertion for the last few days.  Also having some symptoms at rest.  No chest pain, shortness of breath, palpitations.    EKG normal sinus rhythm with incomplete right bundle branch block.  No acute changes.    ED Course     No significant lab changes.  Admitting to medical service for telemetry monitoring.  Heart score 6.  Concern for ACS, arrhythmia.    Critical Care Time  Procedures

## 2024-11-13 ENCOUNTER — TELEPHONE (OUTPATIENT)
Age: 69
End: 2024-11-13

## 2024-11-13 NOTE — TELEPHONE ENCOUNTER
S/W pt and advised VANESSA would need to be signed to send the Xray report, and images would need to be requested from Medical Records  He was not happy with this information but I advised him this is what would be needed.  Also advised pt that result report and images could be found in his active MyChart  Pt states he will have his daughter look for them

## 2024-11-13 NOTE — TELEPHONE ENCOUNTER
Caller: Pt    Doctor: Chente     Reason for call: Pt called looking for his Xrays to be sent to another provider. I asked him to call back with fax number so we can fax report.     Please advise him to call Medical Records for actual images.    Call back#: [phone]

## 2024-11-13 NOTE — TELEPHONE ENCOUNTER
Caller: Isac    Doctor: Chente    Reason for call: patient called back with FAX # [phone]    Call back#: [phone]

## 2024-11-15 NOTE — TELEPHONE ENCOUNTER
Caller: monika    Doctor: shobha    Reason for call: pt would like a copy of his xray images and the report. Pt was upset that he can not just come in and get them. I let him know that I would be happy to fax his report to his chiropractor but he would have to fill out the release for the imaging to be put on a disk. He was not happy and then hung up.    Call back#: [phone]

## 2025-06-04 ENCOUNTER — TELEPHONE (OUTPATIENT)
Age: 70
End: 2025-06-04

## 2025-06-04 NOTE — TELEPHONE ENCOUNTER
Patient calls in today, because he is looking for a referral.    He would like to know what specialist to see for erectile dysfunction.  Advised patient that he can see and speak with Primary Care Provider.  He does not want to make an appointment with a primary, if they are just going to send him to a specialist.     Advised patient, that last ANNUAL WELLNESS VISIT was 8/30/2023.  Patient states that he does not want to make an appointment at this time.  He does not have any health concerns.    Please advise on referral.

## 2025-06-06 ENCOUNTER — TELEPHONE (OUTPATIENT)
Age: 70
End: 2025-06-06

## 2025-06-06 NOTE — TELEPHONE ENCOUNTER
New Patient      Insurance   Current Insurance?[employer]  Rep   Insurance E-verified? Yes    History   Reason for appointment/active symptoms?  Np E.D.      Has the patient had any previous Urologist(s)? No     Was the patient seen in the ED? No     Labs/Imaging(Including Out Of Network)? No     Records Requested? Yes  Records Visible in EPIC? Yes     Personal history of cancer? No     Appointment   Office location preference:  [facility]  ?   Appointment Details   Date:  8/22  Time:  11:45  Location:  [facility]  Provider:  Rj  Does the appointment need further review? No

## 2025-08-05 ENCOUNTER — OFFICE VISIT (OUTPATIENT)
Dept: FAMILY MEDICINE CLINIC | Facility: CLINIC | Age: 70
End: 2025-08-05
Payer: COMMERCIAL

## 2025-08-05 VITALS
WEIGHT: 257 LBS | DIASTOLIC BLOOD PRESSURE: 88 MMHG | HEART RATE: 73 BPM | SYSTOLIC BLOOD PRESSURE: 140 MMHG | BODY MASS INDEX: 34.81 KG/M2 | OXYGEN SATURATION: 98 % | HEIGHT: 72 IN

## 2025-08-05 DIAGNOSIS — R29.898 LEFT LEG WEAKNESS: ICD-10-CM

## 2025-08-05 DIAGNOSIS — Z12.5 SCREENING FOR PROSTATE CANCER: ICD-10-CM

## 2025-08-05 DIAGNOSIS — R03.0 ELEVATED BLOOD PRESSURE READING IN OFFICE WITHOUT DIAGNOSIS OF HYPERTENSION: ICD-10-CM

## 2025-08-05 DIAGNOSIS — R39.12 POOR URINARY STREAM: ICD-10-CM

## 2025-08-05 DIAGNOSIS — R73.03 PREDIABETES: ICD-10-CM

## 2025-08-05 DIAGNOSIS — M17.0 PRIMARY OSTEOARTHRITIS OF BOTH KNEES: Primary | ICD-10-CM

## 2025-08-05 DIAGNOSIS — Z12.11 COLON CANCER SCREENING: ICD-10-CM

## 2025-08-05 DIAGNOSIS — Z13.220 SCREENING FOR LIPID DISORDERS: ICD-10-CM

## 2025-08-05 DIAGNOSIS — M47.26 OSTEOARTHRITIS OF SPINE WITH RADICULOPATHY, LUMBAR REGION: ICD-10-CM

## 2025-08-05 DIAGNOSIS — F32.A DEPRESSION, UNSPECIFIED DEPRESSION TYPE: ICD-10-CM

## 2025-08-05 DIAGNOSIS — F41.9 ANXIETY: ICD-10-CM

## 2025-08-05 PROCEDURE — 99214 OFFICE O/P EST MOD 30 MIN: CPT | Performed by: FAMILY MEDICINE

## 2025-08-05 PROCEDURE — G2211 COMPLEX E/M VISIT ADD ON: HCPCS | Performed by: FAMILY MEDICINE

## 2025-08-22 ENCOUNTER — OFFICE VISIT (OUTPATIENT)
Age: 70
End: 2025-08-22
Payer: COMMERCIAL

## 2025-08-22 VITALS
BODY MASS INDEX: 34.54 KG/M2 | SYSTOLIC BLOOD PRESSURE: 148 MMHG | DIASTOLIC BLOOD PRESSURE: 100 MMHG | WEIGHT: 255 LBS | OXYGEN SATURATION: 97 % | HEART RATE: 86 BPM | TEMPERATURE: 97 F | HEIGHT: 72 IN

## 2025-08-22 DIAGNOSIS — N52.8 OTHER MALE ERECTILE DYSFUNCTION: Primary | ICD-10-CM

## 2025-08-22 PROCEDURE — 99203 OFFICE O/P NEW LOW 30 MIN: CPT | Performed by: PHYSICIAN ASSISTANT

## 2025-08-22 RX ORDER — TADALAFIL 20 MG/1
20 TABLET ORAL DAILY PRN
Qty: 30 TABLET | Refills: 3 | Status: SHIPPED | OUTPATIENT
Start: 2025-08-22

## (undated) DEVICE — DRAPE EQUIPMENT RF WAND

## (undated) DEVICE — VIAL DECANTER

## (undated) DEVICE — NEEDLE 22 G X 1 1/2 SAFETY

## (undated) DEVICE — LIGHT HANDLE COVER SLEEVE DISP BLUE STELLAR

## (undated) DEVICE — INTENDED FOR TISSUE SEPARATION, AND OTHER PROCEDURES THAT REQUIRE A SHARP SURGICAL BLADE TO PUNCTURE OR CUT.: Brand: BARD-PARKER SAFETY BLADES SIZE 15, STERILE

## (undated) DEVICE — BETHLEHEM UNIVERSAL MINOR GEN: Brand: CARDINAL HEALTH

## (undated) DEVICE — SUT VICRYL 1 CT-1 27 IN J261H

## (undated) DEVICE — GLOVE SRG BIOGEL ECLIPSE 7

## (undated) DEVICE — PAD GROUNDING ADULT

## (undated) DEVICE — CHLORAPREP HI-LITE 26ML ORANGE

## (undated) DEVICE — SUT MONOCRYL 4-0 PS-2 18 IN Y496G

## (undated) DEVICE — PENCIL ELECTROSURG E-Z CLEAN -0035H

## (undated) DEVICE — ADHESIVE SKIN HIGH VISCOSITY EXOFIN 1ML

## (undated) DEVICE — SUT VICRYL 2-0 REEL 54 IN J286G